# Patient Record
Sex: FEMALE | Race: WHITE | NOT HISPANIC OR LATINO | Employment: UNEMPLOYED | URBAN - METROPOLITAN AREA
[De-identification: names, ages, dates, MRNs, and addresses within clinical notes are randomized per-mention and may not be internally consistent; named-entity substitution may affect disease eponyms.]

---

## 2017-02-03 ENCOUNTER — OFFICE VISIT (OUTPATIENT)
Dept: INTERNAL MEDICINE | Facility: CLINIC | Age: 47
End: 2017-02-03

## 2017-02-03 ENCOUNTER — TELEPHONE (OUTPATIENT)
Dept: INTERNAL MEDICINE | Facility: CLINIC | Age: 47
End: 2017-02-03

## 2017-02-03 VITALS
BODY MASS INDEX: 25.79 KG/M2 | HEART RATE: 92 BPM | DIASTOLIC BLOOD PRESSURE: 80 MMHG | SYSTOLIC BLOOD PRESSURE: 114 MMHG | OXYGEN SATURATION: 98 % | HEIGHT: 64 IN | WEIGHT: 151.06 LBS

## 2017-02-03 DIAGNOSIS — M54.16 LUMBAR RADICULOPATHY: ICD-10-CM

## 2017-02-03 DIAGNOSIS — M54.50 ACUTE BILATERAL LOW BACK PAIN WITHOUT SCIATICA: Primary | ICD-10-CM

## 2017-02-03 PROBLEM — C73 THYROID CARCINOMA (HCC): Status: ACTIVE | Noted: 2017-02-03

## 2017-02-03 PROCEDURE — 99213 OFFICE O/P EST LOW 20 MIN: CPT | Performed by: NURSE PRACTITIONER

## 2017-02-03 RX ORDER — MELOXICAM 7.5 MG/1
7.5 TABLET ORAL DAILY
Qty: 60 TABLET | Refills: 0 | Status: SHIPPED | OUTPATIENT
Start: 2017-02-03 | End: 2019-12-20

## 2017-02-03 RX ORDER — CYCLOBENZAPRINE HCL 5 MG
5 TABLET ORAL NIGHTLY PRN
Qty: 30 TABLET | Refills: 0 | Status: SHIPPED | OUTPATIENT
Start: 2017-02-03 | End: 2019-12-20

## 2017-02-03 NOTE — TELEPHONE ENCOUNTER
----- Message from Lelo Fox sent at 2/3/2017  9:09 AM EST -----  Regarding: APPOINTMENT REQUEST  Contact: [phone]  Patient called requesting to be seen today - do you see any place to put her?  She has a long h/o low back pain since she was a teenager. Present episode began insidiously on Tuesday with pain across the low back radiating into both hips and both thighs to the knees, anteriorly and posteriorly, without numbness/tingling or weakness.  However, she says the pain is pretty bad.

## 2017-08-09 ENCOUNTER — TRANSCRIBE ORDERS (OUTPATIENT)
Dept: ADMINISTRATIVE | Facility: HOSPITAL | Age: 47
End: 2017-08-09

## 2017-08-09 ENCOUNTER — LAB (OUTPATIENT)
Dept: LAB | Facility: HOSPITAL | Age: 47
End: 2017-08-09

## 2017-08-09 DIAGNOSIS — E83.51 CALCIUM DEFICIENCY DISEASE: ICD-10-CM

## 2017-08-09 DIAGNOSIS — E83.51 CALCIUM DEFICIENCY DISEASE: Primary | ICD-10-CM

## 2017-08-09 LAB
CALCIUM SPEC-SCNC: 9.5 MG/DL (ref 8.6–10.5)
PTH-INTACT SERPL-MCNC: 11.3 PG/ML (ref 15–65)

## 2017-08-09 PROCEDURE — 83970 ASSAY OF PARATHORMONE: CPT | Performed by: SURGERY

## 2017-08-09 PROCEDURE — 36415 COLL VENOUS BLD VENIPUNCTURE: CPT

## 2017-08-09 PROCEDURE — 82310 ASSAY OF CALCIUM: CPT

## 2017-11-09 ENCOUNTER — HOSPITAL ENCOUNTER (OUTPATIENT)
Dept: GENERAL RADIOLOGY | Facility: HOSPITAL | Age: 47
Discharge: HOME | End: 2017-11-09

## 2017-11-09 ENCOUNTER — HOSPITAL ENCOUNTER (OUTPATIENT)
Dept: GENERAL RADIOLOGY | Facility: HOSPITAL | Age: 47
Discharge: HOME | End: 2017-11-09
Admitting: CHIROPRACTOR

## 2017-11-09 ENCOUNTER — TRANSCRIBE ORDERS (OUTPATIENT)
Dept: ADMINISTRATIVE | Facility: HOSPITAL | Age: 47
End: 2017-11-09

## 2017-11-09 DIAGNOSIS — M54.2 NECK PAIN: ICD-10-CM

## 2017-11-09 DIAGNOSIS — M99.03 SOMATIC DYSFUNCTION OF LUMBAR REGION: ICD-10-CM

## 2017-11-09 DIAGNOSIS — M54.40 LOW BACK PAIN WITH SCIATICA, SCIATICA LATERALITY UNSPECIFIED, UNSPECIFIED BACK PAIN LATERALITY, UNSPECIFIED CHRONICITY: Primary | ICD-10-CM

## 2017-11-09 DIAGNOSIS — M54.40 LOW BACK PAIN WITH SCIATICA, SCIATICA LATERALITY UNSPECIFIED, UNSPECIFIED BACK PAIN LATERALITY, UNSPECIFIED CHRONICITY: ICD-10-CM

## 2017-11-09 DIAGNOSIS — M99.01 CERVICAL SEGMENT DYSFUNCTION: ICD-10-CM

## 2017-11-09 PROCEDURE — 72110 X-RAY EXAM L-2 SPINE 4/>VWS: CPT

## 2017-11-09 PROCEDURE — 72050 X-RAY EXAM NECK SPINE 4/5VWS: CPT

## 2018-03-07 ENCOUNTER — APPOINTMENT (OUTPATIENT)
Dept: WOMENS IMAGING | Facility: HOSPITAL | Age: 48
End: 2018-03-07

## 2018-03-07 PROCEDURE — 77067 SCR MAMMO BI INCL CAD: CPT | Performed by: RADIOLOGY

## 2018-03-07 PROCEDURE — 77063 BREAST TOMOSYNTHESIS BI: CPT | Performed by: RADIOLOGY

## 2018-07-05 ENCOUNTER — OFFICE VISIT (OUTPATIENT)
Dept: INTERNAL MEDICINE | Facility: CLINIC | Age: 48
End: 2018-07-05

## 2018-07-05 VITALS
DIASTOLIC BLOOD PRESSURE: 70 MMHG | HEIGHT: 64 IN | HEART RATE: 107 BPM | TEMPERATURE: 98.1 F | WEIGHT: 154.6 LBS | BODY MASS INDEX: 26.4 KG/M2 | SYSTOLIC BLOOD PRESSURE: 110 MMHG | OXYGEN SATURATION: 97 %

## 2018-07-05 DIAGNOSIS — R40.0 DAYTIME SOMNOLENCE: ICD-10-CM

## 2018-07-05 DIAGNOSIS — E78.5 HYPERLIPIDEMIA, UNSPECIFIED HYPERLIPIDEMIA TYPE: ICD-10-CM

## 2018-07-05 DIAGNOSIS — R06.83 SNORING: ICD-10-CM

## 2018-07-05 DIAGNOSIS — C73 THYROID CARCINOMA (HCC): ICD-10-CM

## 2018-07-05 DIAGNOSIS — K91.1 DUMPING SYNDROME: ICD-10-CM

## 2018-07-05 DIAGNOSIS — Z00.00 HEALTH CARE MAINTENANCE: Primary | ICD-10-CM

## 2018-07-05 PROBLEM — J45.909 ASTHMA: Status: ACTIVE | Noted: 2018-07-05

## 2018-07-05 PROCEDURE — 99396 PREV VISIT EST AGE 40-64: CPT | Performed by: NURSE PRACTITIONER

## 2018-07-05 RX ORDER — MONTELUKAST SODIUM 10 MG/1
10 TABLET ORAL NIGHTLY
Qty: 30 TABLET | Refills: 0
Start: 2018-07-05 | End: 2019-07-11 | Stop reason: SDUPTHER

## 2018-07-05 RX ORDER — LEVOTHYROXINE SODIUM 0.12 MG/1
125 TABLET ORAL DAILY
Qty: 30 TABLET | Refills: 2
Start: 2018-07-05 | End: 2019-07-11

## 2018-07-05 RX ORDER — CHOLESTYRAMINE 4 G/9G
1 POWDER, FOR SUSPENSION ORAL DAILY PRN
Qty: 60 EACH | Refills: 3 | Status: SHIPPED | OUTPATIENT
Start: 2018-07-05 | End: 2018-11-29 | Stop reason: SDUPTHER

## 2018-07-05 NOTE — PROGRESS NOTES
"Subjective   Juliana Rodriguez is a 47 y.o. female here for CPE.    History of Present Illness   The patient is here today for CPE. She is feeling ok. Is having fatigue. Getting at least 7 hrs of sleep. She does occasionally snore, more with allergies. She is using elliptical/treadmill/weights 3 days a week. She is eating a healthy diet.   Diarrhea- since GB removal, probiotic not helping.   Started to have irregular periods. Also with hot flashes and night sweats occasionally.   GYN- UTD    Hx of thyroid carcinoma- monitored per Dr. Orosco, labs ok per patient   Raymundo, may send son Raymundo as new patient.   The following portions of the patient's history were reviewed and updated as appropriate: allergies, current medications, past family history, past medical history, past social history, past surgical history and problem list.    Review of Systems   Constitutional: Positive for fatigue.   HENT: Negative.    Eyes: Negative.    Respiratory: Positive for cough (with allergies) and wheezing (allergies).    Cardiovascular: Positive for palpitations (lasts for 1-2 seconds, maybe every couple of months). Negative for chest pain.   Gastrointestinal: Positive for diarrhea (since GB). Negative for abdominal distention, abdominal pain, anal bleeding, blood in stool, constipation, nausea, rectal pain and vomiting.   Endocrine: Positive for heat intolerance. Negative for cold intolerance.   Genitourinary: Negative.    Musculoskeletal: Positive for back pain (occ).   Skin: Negative.    Allergic/Immunologic: Positive for environmental allergies. Negative for food allergies.   Neurological: Positive for dizziness (occ. \"a little bit\"). Negative for tremors, seizures, syncope, facial asymmetry, speech difficulty, weakness, light-headedness, numbness and headaches.   Hematological: Positive for adenopathy (cervical ). Does not bruise/bleed easily.   Psychiatric/Behavioral: Negative.        Objective   Physical Exam "   Constitutional: She is oriented to person, place, and time. Vital signs are normal. She appears well-developed and well-nourished.   HENT:   Right Ear: Hearing, tympanic membrane, external ear and ear canal normal.   Left Ear: Hearing, tympanic membrane, external ear and ear canal normal.   Nose: Mucosal edema (left nare) present.   Mouth/Throat: Uvula is midline, oropharynx is clear and moist and mucous membranes are normal.   Ridging on tongue   Eyes: Conjunctivae, EOM and lids are normal. Pupils are equal, round, and reactive to light.   Neck: Normal range of motion. Neck supple. Normal carotid pulses present. Carotid bruit is not present. No thyromegaly present.   Cardiovascular: Normal rate, regular rhythm, normal heart sounds and intact distal pulses.    Pulmonary/Chest: Effort normal and breath sounds normal.   Abdominal: Soft. Normal appearance, normal aorta and bowel sounds are normal. There is no hepatosplenomegaly. There is no tenderness.   Musculoskeletal: Normal range of motion.   Lymphadenopathy:     She has no cervical adenopathy.        Right: No inguinal and no supraclavicular adenopathy present.        Left: No inguinal and no supraclavicular adenopathy present.   Neurological: She is alert and oriented to person, place, and time. She has normal strength. No cranial nerve deficit or sensory deficit.   Reflex Scores:       Patellar reflexes are 1+ on the right side and 1+ on the left side.  Skin: Skin is warm, dry and intact.   Psychiatric: She has a normal mood and affect. Her speech is normal and behavior is normal. Judgment and thought content normal. Cognition and memory are normal.       Assessment/Plan   There are no diagnoses linked to this encounter.    1. HCM- continue to be active and eat a healthy diet.   2. Daytime somnolence/snoring- check sleep study, check B12 if not already done  3. Dumping syndrome- try cholestyramine, set up for C-scope  4. Hx of thyroid carcinoma- need note from  [facility]  5. HPL- Family hx of heart disease. Check today

## 2018-07-12 ENCOUNTER — RESULTS ENCOUNTER (OUTPATIENT)
Dept: INTERNAL MEDICINE | Facility: CLINIC | Age: 48
End: 2018-07-12

## 2018-07-12 DIAGNOSIS — R06.83 SNORING: ICD-10-CM

## 2018-07-12 DIAGNOSIS — R40.0 DAYTIME SOMNOLENCE: ICD-10-CM

## 2018-07-12 DIAGNOSIS — E78.5 HYPERLIPIDEMIA, UNSPECIFIED HYPERLIPIDEMIA TYPE: ICD-10-CM

## 2018-07-12 DIAGNOSIS — Z00.00 HEALTH CARE MAINTENANCE: ICD-10-CM

## 2018-07-12 LAB
ALBUMIN SERPL-MCNC: 5 G/DL (ref 3.5–5.2)
ALBUMIN/GLOB SERPL: 2 G/DL
ALP SERPL-CCNC: 61 U/L (ref 39–117)
ALT SERPL-CCNC: 16 U/L (ref 1–33)
AST SERPL-CCNC: 14 U/L (ref 1–32)
BASOPHILS # BLD AUTO: 0.05 10*3/MM3 (ref 0–0.2)
BASOPHILS NFR BLD AUTO: 0.5 % (ref 0–1.5)
BILIRUB SERPL-MCNC: 0.4 MG/DL (ref 0.1–1.2)
BUN SERPL-MCNC: 8 MG/DL (ref 6–20)
BUN/CREAT SERPL: 8.5 (ref 7–25)
CALCIUM SERPL-MCNC: 9.2 MG/DL (ref 8.6–10.5)
CHLORIDE SERPL-SCNC: 99 MMOL/L (ref 98–107)
CHOLEST SERPL-MCNC: 204 MG/DL (ref 0–200)
CO2 SERPL-SCNC: 20.6 MMOL/L (ref 22–29)
CREAT SERPL-MCNC: 0.94 MG/DL (ref 0.57–1)
EOSINOPHIL # BLD AUTO: 0.36 10*3/MM3 (ref 0–0.7)
EOSINOPHIL NFR BLD AUTO: 3.4 % (ref 0.3–6.2)
ERYTHROCYTE [DISTWIDTH] IN BLOOD BY AUTOMATED COUNT: 14.2 % (ref 11.7–13)
FOLATE SERPL-MCNC: 18.7 NG/ML (ref 4.78–24.2)
GLOBULIN SER CALC-MCNC: 2.5 GM/DL
GLUCOSE SERPL-MCNC: 84 MG/DL (ref 65–99)
HCT VFR BLD AUTO: 47.2 % (ref 35.6–45.5)
HDLC SERPL-MCNC: 47 MG/DL (ref 40–60)
HGB BLD-MCNC: 15.2 G/DL (ref 11.9–15.5)
IMM GRANULOCYTES # BLD: 0.02 10*3/MM3 (ref 0–0.03)
IMM GRANULOCYTES NFR BLD: 0.2 % (ref 0–0.5)
LDLC SERPL CALC-MCNC: 107 MG/DL (ref 0–100)
LDLC/HDLC SERPL: 2.28 {RATIO}
LYMPHOCYTES # BLD AUTO: 3.95 10*3/MM3 (ref 0.9–4.8)
LYMPHOCYTES NFR BLD AUTO: 37 % (ref 19.6–45.3)
MCH RBC QN AUTO: 29.1 PG (ref 26.9–32)
MCHC RBC AUTO-ENTMCNC: 32.2 G/DL (ref 32.4–36.3)
MCV RBC AUTO: 90.2 FL (ref 80.5–98.2)
MONOCYTES # BLD AUTO: 0.57 10*3/MM3 (ref 0.2–1.2)
MONOCYTES NFR BLD AUTO: 5.3 % (ref 5–12)
NEUTROPHILS # BLD AUTO: 5.74 10*3/MM3 (ref 1.9–8.1)
NEUTROPHILS NFR BLD AUTO: 53.8 % (ref 42.7–76)
PLATELET # BLD AUTO: 266 10*3/MM3 (ref 140–500)
POTASSIUM SERPL-SCNC: 4.7 MMOL/L (ref 3.5–5.2)
PROT SERPL-MCNC: 7.5 G/DL (ref 6–8.5)
RBC # BLD AUTO: 5.23 10*6/MM3 (ref 3.9–5.2)
SODIUM SERPL-SCNC: 137 MMOL/L (ref 136–145)
TRIGL SERPL-MCNC: 250 MG/DL (ref 0–150)
VIT B12 SERPL-MCNC: 836 PG/ML (ref 211–946)
VLDLC SERPL CALC-MCNC: 50 MG/DL (ref 5–40)
WBC # BLD AUTO: 10.67 10*3/MM3 (ref 4.5–10.7)

## 2018-07-30 ENCOUNTER — TELEPHONE (OUTPATIENT)
Dept: INTERNAL MEDICINE | Facility: CLINIC | Age: 48
End: 2018-07-30

## 2018-09-12 ENCOUNTER — PREP FOR SURGERY (OUTPATIENT)
Dept: OTHER | Facility: HOSPITAL | Age: 48
End: 2018-09-12

## 2018-09-12 DIAGNOSIS — R19.7 DIARRHEA, UNSPECIFIED TYPE: Primary | ICD-10-CM

## 2018-10-26 ENCOUNTER — OUTSIDE FACILITY SERVICE (OUTPATIENT)
Dept: GASTROENTEROLOGY | Facility: CLINIC | Age: 48
End: 2018-10-26

## 2018-10-26 PROCEDURE — 45380 COLONOSCOPY AND BIOPSY: CPT | Performed by: INTERNAL MEDICINE

## 2018-10-26 PROCEDURE — 45385 COLONOSCOPY W/LESION REMOVAL: CPT | Performed by: INTERNAL MEDICINE

## 2018-11-05 ENCOUNTER — TELEPHONE (OUTPATIENT)
Dept: GASTROENTEROLOGY | Facility: CLINIC | Age: 48
End: 2018-11-05

## 2018-11-05 NOTE — TELEPHONE ENCOUNTER
Colon polyps were benign-repeat colonoscopy in 10 years for screening.  No inflammation was found on colon biopsies.

## 2018-11-06 NOTE — TELEPHONE ENCOUNTER
Called pt and advised per Dr Conley that the colon polyps that were remove were benign.  No inflammation was found on colon bx.  She recommends a repeat c/s in 10 yrs for screening. Pt verb understanding.     C/s placed in recall for 10/26/228.

## 2018-11-28 ENCOUNTER — CLINICAL SUPPORT (OUTPATIENT)
Dept: INTERNAL MEDICINE | Facility: CLINIC | Age: 48
End: 2018-11-28

## 2018-11-28 DIAGNOSIS — Z23 NEED FOR HEPATITIS A IMMUNIZATION: Primary | ICD-10-CM

## 2018-11-28 PROCEDURE — 90471 IMMUNIZATION ADMIN: CPT | Performed by: NURSE PRACTITIONER

## 2018-11-28 PROCEDURE — 90632 HEPA VACCINE ADULT IM: CPT | Performed by: NURSE PRACTITIONER

## 2018-11-29 DIAGNOSIS — K91.1 DUMPING SYNDROME: ICD-10-CM

## 2018-11-29 RX ORDER — CHOLESTYRAMINE 4 G/9G
1 POWDER, FOR SUSPENSION ORAL DAILY PRN
Qty: 60 EACH | Refills: 3 | Status: SHIPPED | OUTPATIENT
Start: 2018-11-29 | End: 2019-03-27 | Stop reason: SDUPTHER

## 2019-03-14 ENCOUNTER — APPOINTMENT (OUTPATIENT)
Dept: WOMENS IMAGING | Facility: HOSPITAL | Age: 49
End: 2019-03-14

## 2019-03-14 PROCEDURE — 77063 BREAST TOMOSYNTHESIS BI: CPT | Performed by: RADIOLOGY

## 2019-03-14 PROCEDURE — 77067 SCR MAMMO BI INCL CAD: CPT | Performed by: RADIOLOGY

## 2019-03-27 DIAGNOSIS — K91.1 DUMPING SYNDROME: ICD-10-CM

## 2019-03-28 RX ORDER — CHOLESTYRAMINE 4 G/9G
1 POWDER, FOR SUSPENSION ORAL DAILY PRN
Qty: 60 PACKET | Refills: 0 | Status: SHIPPED | OUTPATIENT
Start: 2019-03-28 | End: 2019-07-11 | Stop reason: SDUPTHER

## 2019-07-03 DIAGNOSIS — Z00.00 ANNUAL PHYSICAL EXAM: Primary | ICD-10-CM

## 2019-07-03 DIAGNOSIS — E78.5 HYPERLIPIDEMIA, UNSPECIFIED HYPERLIPIDEMIA TYPE: ICD-10-CM

## 2019-07-03 DIAGNOSIS — E55.9 VITAMIN D DEFICIENCY: ICD-10-CM

## 2019-07-08 LAB
25(OH)D3+25(OH)D2 SERPL-MCNC: 29.1 NG/ML (ref 30–100)
ALBUMIN SERPL-MCNC: 4.6 G/DL (ref 3.5–5.2)
ALBUMIN/GLOB SERPL: 2.1 G/DL
ALP SERPL-CCNC: 62 U/L (ref 39–117)
ALT SERPL-CCNC: 16 U/L (ref 1–33)
AST SERPL-CCNC: 16 U/L (ref 1–32)
BASOPHILS # BLD AUTO: 0.08 10*3/MM3 (ref 0–0.2)
BASOPHILS NFR BLD AUTO: 1.2 % (ref 0–1.5)
BILIRUB SERPL-MCNC: 0.3 MG/DL (ref 0.2–1.2)
BUN SERPL-MCNC: 8 MG/DL (ref 6–20)
BUN/CREAT SERPL: 9.5 (ref 7–25)
CALCIUM SERPL-MCNC: 8.7 MG/DL (ref 8.6–10.5)
CHLORIDE SERPL-SCNC: 104 MMOL/L (ref 98–107)
CHOLEST SERPL-MCNC: 193 MG/DL (ref 0–200)
CO2 SERPL-SCNC: 22.9 MMOL/L (ref 22–29)
CREAT SERPL-MCNC: 0.84 MG/DL (ref 0.57–1)
EOSINOPHIL # BLD AUTO: 0.36 10*3/MM3 (ref 0–0.4)
EOSINOPHIL NFR BLD AUTO: 5.2 % (ref 0.3–6.2)
ERYTHROCYTE [DISTWIDTH] IN BLOOD BY AUTOMATED COUNT: 13.6 % (ref 12.3–15.4)
GLOBULIN SER CALC-MCNC: 2.2 GM/DL
GLUCOSE SERPL-MCNC: 87 MG/DL (ref 65–99)
HCT VFR BLD AUTO: 44 % (ref 34–46.6)
HDLC SERPL-MCNC: 42 MG/DL (ref 40–60)
HGB BLD-MCNC: 14.3 G/DL (ref 12–15.9)
IMM GRANULOCYTES # BLD AUTO: 0.02 10*3/MM3 (ref 0–0.05)
IMM GRANULOCYTES NFR BLD AUTO: 0.3 % (ref 0–0.5)
LDLC SERPL CALC-MCNC: 109 MG/DL (ref 0–100)
LDLC/HDLC SERPL: 2.6 {RATIO}
LYMPHOCYTES # BLD AUTO: 2.41 10*3/MM3 (ref 0.7–3.1)
LYMPHOCYTES NFR BLD AUTO: 34.7 % (ref 19.6–45.3)
MCH RBC QN AUTO: 28.5 PG (ref 26.6–33)
MCHC RBC AUTO-ENTMCNC: 32.5 G/DL (ref 31.5–35.7)
MCV RBC AUTO: 87.8 FL (ref 79–97)
MONOCYTES # BLD AUTO: 0.41 10*3/MM3 (ref 0.1–0.9)
MONOCYTES NFR BLD AUTO: 5.9 % (ref 5–12)
NEUTROPHILS # BLD AUTO: 3.66 10*3/MM3 (ref 1.7–7)
NEUTROPHILS NFR BLD AUTO: 52.7 % (ref 42.7–76)
NRBC BLD AUTO-RTO: 0 /100 WBC (ref 0–0.2)
PLATELET # BLD AUTO: 247 10*3/MM3 (ref 140–450)
POTASSIUM SERPL-SCNC: 4.1 MMOL/L (ref 3.5–5.2)
PROT SERPL-MCNC: 6.8 G/DL (ref 6–8.5)
RBC # BLD AUTO: 5.01 10*6/MM3 (ref 3.77–5.28)
SODIUM SERPL-SCNC: 139 MMOL/L (ref 136–145)
TRIGL SERPL-MCNC: 210 MG/DL (ref 0–150)
TSH SERPL DL<=0.005 MIU/L-ACNC: 0.87 MIU/ML (ref 0.27–4.2)
VLDLC SERPL CALC-MCNC: 42 MG/DL
WBC # BLD AUTO: 6.94 10*3/MM3 (ref 3.4–10.8)

## 2019-07-11 ENCOUNTER — OFFICE VISIT (OUTPATIENT)
Dept: INTERNAL MEDICINE | Facility: CLINIC | Age: 49
End: 2019-07-11

## 2019-07-11 VITALS
HEART RATE: 105 BPM | SYSTOLIC BLOOD PRESSURE: 114 MMHG | BODY MASS INDEX: 25.56 KG/M2 | HEIGHT: 64 IN | TEMPERATURE: 98.9 F | WEIGHT: 149.7 LBS | OXYGEN SATURATION: 98 % | DIASTOLIC BLOOD PRESSURE: 80 MMHG

## 2019-07-11 DIAGNOSIS — C73 THYROID CARCINOMA (HCC): ICD-10-CM

## 2019-07-11 DIAGNOSIS — J30.9 ALLERGIC RHINITIS, UNSPECIFIED SEASONALITY, UNSPECIFIED TRIGGER: ICD-10-CM

## 2019-07-11 DIAGNOSIS — J45.21 MILD INTERMITTENT ASTHMA WITH ACUTE EXACERBATION: ICD-10-CM

## 2019-07-11 DIAGNOSIS — M79.672 LEFT FOOT PAIN: ICD-10-CM

## 2019-07-11 DIAGNOSIS — K91.1 DUMPING SYNDROME: ICD-10-CM

## 2019-07-11 DIAGNOSIS — Z00.00 HEALTH CARE MAINTENANCE: Primary | ICD-10-CM

## 2019-07-11 DIAGNOSIS — E55.9 VITAMIN D DEFICIENCY: ICD-10-CM

## 2019-07-11 PROCEDURE — 93000 ELECTROCARDIOGRAM COMPLETE: CPT | Performed by: NURSE PRACTITIONER

## 2019-07-11 PROCEDURE — 99396 PREV VISIT EST AGE 40-64: CPT | Performed by: NURSE PRACTITIONER

## 2019-07-11 RX ORDER — CHOLESTYRAMINE 4 G/9G
1 POWDER, FOR SUSPENSION ORAL DAILY PRN
Qty: 60 PACKET | Refills: 2 | Status: SHIPPED | OUTPATIENT
Start: 2019-07-11 | End: 2019-10-30 | Stop reason: SDUPTHER

## 2019-07-11 RX ORDER — FLUTICASONE PROPIONATE 50 MCG
1 SPRAY, SUSPENSION (ML) NASAL DAILY
Qty: 3 BOTTLE | Refills: 3 | Status: SHIPPED | OUTPATIENT
Start: 2019-07-11

## 2019-07-11 RX ORDER — MONTELUKAST SODIUM 10 MG/1
10 TABLET ORAL NIGHTLY
Qty: 90 TABLET | Refills: 3
Start: 2019-07-11 | End: 2019-11-21 | Stop reason: SDUPTHER

## 2019-07-11 RX ORDER — LEVOTHYROXINE SODIUM 150 MCG
150 TABLET ORAL DAILY
Refills: 3 | COMMUNITY
Start: 2019-06-29 | End: 2022-05-26

## 2019-07-11 RX ORDER — BUDESONIDE AND FORMOTEROL FUMARATE DIHYDRATE 80; 4.5 UG/1; UG/1
2 AEROSOL RESPIRATORY (INHALATION)
Qty: 10.2 G | Refills: 3 | Status: SHIPPED | OUTPATIENT
Start: 2019-07-11 | End: 2019-10-31 | Stop reason: SDUPTHER

## 2019-07-11 NOTE — PROGRESS NOTES
Subjective   Juliana Rodriguez is a 48 y.o. female.     History of Present Illness   The patient is here today for CPE and lab work F/U. She is feeling well. She thinks she has plantar fasciitis. She saw podiatry about a year ago. Using exercises but not helping.     Hypothyroidism/Hx of thyroid carcinoma-  Asthma- just the last few weeks with cough, dry, no fever  Dumping syndrome- much better  Periods irregular, UTD with GYN    The following portions of the patient's history were reviewed and updated as appropriate: allergies, current medications, past family history, past medical history, past social history, past surgical history and problem list.    Review of Systems   Constitutional: Negative for chills, fatigue and fever.   HENT: Negative for ear pain, rhinorrhea and sore throat.    Eyes: Negative.    Respiratory: Positive for cough, shortness of breath and wheezing.    Cardiovascular: Negative.    Gastrointestinal: Positive for diarrhea (treated).   Endocrine: Negative for cold intolerance and heat intolerance.   Genitourinary: Negative for breast discharge, difficulty urinating, dyspareunia, dysuria, flank pain, frequency, genital sores, hematuria, pelvic pain, urgency, breast lump and breast pain.   Musculoskeletal: Negative.    Skin: Negative.    Allergic/Immunologic: Negative for environmental allergies and food allergies.   Neurological: Negative.    Hematological: Negative.    Psychiatric/Behavioral: Negative for dysphoric mood and suicidal ideas. The patient is not nervous/anxious.        Objective   Physical Exam   Constitutional: She is oriented to person, place, and time. Vital signs are normal. She appears well-developed and well-nourished. She is cooperative.   Body mass index is 25.68 kg/m².     HENT:   Right Ear: Hearing, tympanic membrane, external ear and ear canal normal.   Left Ear: Hearing, tympanic membrane, external ear and ear canal normal.   Nose: Nose normal.   Mouth/Throat: Uvula is  midline, oropharynx is clear and moist and mucous membranes are normal.   Eyes: Conjunctivae, EOM and lids are normal. Pupils are equal, round, and reactive to light.   Neck: Trachea normal and normal range of motion. Carotid bruit is not present. No thyroid mass and no thyromegaly present.   Cardiovascular: Normal rate, regular rhythm, normal heart sounds and normal pulses.   Pulmonary/Chest: Effort normal and breath sounds normal.   Abdominal: Soft. Normal appearance, normal aorta and bowel sounds are normal. There is no hepatosplenomegaly. There is no tenderness.   Musculoskeletal: Normal range of motion.   Lymphadenopathy:     She has no cervical adenopathy.     She has no axillary adenopathy. No inguinal adenopathy noted on the right or left side.        Right: No inguinal and no supraclavicular adenopathy present.        Left: No inguinal and no supraclavicular adenopathy present.   Neurological: She is alert and oriented to person, place, and time. She has normal strength. No cranial nerve deficit or sensory deficit. She displays a negative Romberg sign. GCS eye subscore is 4. GCS verbal subscore is 5. GCS motor subscore is 6.   Reflex Scores:       Patellar reflexes are 2+ on the right side and 2+ on the left side.  Skin: Skin is warm, dry and intact.   Psychiatric: She has a normal mood and affect. Her speech is normal and behavior is normal. Judgment and thought content normal. Cognition and memory are normal.       Assessment/Plan   There are no diagnoses linked to this encounter.    HCM/EKG- NSR, no baseline for comparison         1. HCM- return to daily exercise  2. Asthma- needs symbicort, coupon given  3. AR- ok to add zyrtec, has an inhaler at home to use PRN  4. Left foot pain- see podiatry back  5. Hx of thyroid carcinoma- send TSH to [facility], may need adjustment  6. Vit d def- restart VitD3 2000 IU daily    Needs to wear sunscreen  Dentist- UTD

## 2019-10-30 DIAGNOSIS — K91.1 DUMPING SYNDROME: ICD-10-CM

## 2019-10-31 DIAGNOSIS — J45.21 MILD INTERMITTENT ASTHMA WITH ACUTE EXACERBATION: ICD-10-CM

## 2019-10-31 RX ORDER — CHOLESTYRAMINE 4 G/9G
1 POWDER, FOR SUSPENSION ORAL DAILY PRN
Qty: 90 PACKET | Refills: 1 | Status: SHIPPED | OUTPATIENT
Start: 2019-10-31 | End: 2020-04-27

## 2019-10-31 RX ORDER — DILTIAZEM HYDROCHLORIDE 60 MG/1
TABLET, FILM COATED ORAL
Qty: 30.6 INHALER | Refills: 1 | Status: SHIPPED | OUTPATIENT
Start: 2019-10-31 | End: 2019-12-09

## 2019-11-21 ENCOUNTER — OFFICE VISIT (OUTPATIENT)
Dept: INTERNAL MEDICINE | Facility: CLINIC | Age: 49
End: 2019-11-21

## 2019-11-21 VITALS
TEMPERATURE: 98.6 F | DIASTOLIC BLOOD PRESSURE: 76 MMHG | HEIGHT: 64 IN | OXYGEN SATURATION: 97 % | SYSTOLIC BLOOD PRESSURE: 114 MMHG | BODY MASS INDEX: 26.02 KG/M2 | HEART RATE: 107 BPM | WEIGHT: 152.4 LBS

## 2019-11-21 DIAGNOSIS — K21.9 GASTROESOPHAGEAL REFLUX DISEASE, ESOPHAGITIS PRESENCE NOT SPECIFIED: ICD-10-CM

## 2019-11-21 DIAGNOSIS — J45.21 MILD INTERMITTENT ASTHMA WITH ACUTE EXACERBATION: ICD-10-CM

## 2019-11-21 DIAGNOSIS — R07.9 CHEST PAIN, UNSPECIFIED TYPE: Primary | ICD-10-CM

## 2019-11-21 PROCEDURE — 93000 ELECTROCARDIOGRAM COMPLETE: CPT | Performed by: NURSE PRACTITIONER

## 2019-11-21 PROCEDURE — 90471 IMMUNIZATION ADMIN: CPT | Performed by: NURSE PRACTITIONER

## 2019-11-21 PROCEDURE — 90674 CCIIV4 VAC NO PRSV 0.5 ML IM: CPT | Performed by: NURSE PRACTITIONER

## 2019-11-21 PROCEDURE — 99214 OFFICE O/P EST MOD 30 MIN: CPT | Performed by: NURSE PRACTITIONER

## 2019-11-21 RX ORDER — MONTELUKAST SODIUM 10 MG/1
10 TABLET ORAL NIGHTLY
Qty: 90 TABLET | Refills: 3
Start: 2019-11-21 | End: 2019-12-09 | Stop reason: SDUPTHER

## 2019-11-21 NOTE — PROGRESS NOTES
"Subjective   Juliana Rodriguez is a 49 y.o. female.     History of Present Illness    The patient is here today with c/o chest \"achiness\" sharp when laying on left side.   Does not feel tight or wheezy. Dry hacky cough. Worse with talking or laughing. Chest sometimes feels rattly. Hoarse a lot since starting inhaler.  Out of singulair, pharmacy would not fill for some reason.     Endocrinology started her on cytomel this past summer and she had chest pain with this.     No recent travel or prolonged sitting. Dad with hx of blood clots.   The following portions of the patient's history were reviewed and updated as appropriate: allergies, current medications, past family history, past medical history, past social history, past surgical history and problem list.    Review of Systems   Constitutional: Negative.    HENT: Positive for voice change.    Respiratory: Positive for cough (dry) and wheezing (sometimes). Negative for chest tightness and shortness of breath.    Cardiovascular: Positive for chest pain. Negative for palpitations and leg swelling.   Gastrointestinal: Positive for GERD (\"sometimes\", treated with tums sometimes).       Objective   Physical Exam   Constitutional: She appears well-developed and well-nourished.   Neck: Normal range of motion. Neck supple. No thyromegaly present.   Cardiovascular: Normal rate, regular rhythm, normal heart sounds and intact distal pulses.   Pulmonary/Chest: Effort normal and breath sounds normal.   Abdominal: Soft. Normal appearance, normal aorta and bowel sounds are normal. There is tenderness (mild) in the suprapubic area.   Musculoskeletal:   No pain upon palpation of sternum or rib cage   Lymphadenopathy:     She has no cervical adenopathy.   Skin: Skin is warm and dry.   Psychiatric: She has a normal mood and affect. Her behavior is normal. Judgment and thought content normal.       Vitals:    11/21/19 1422   BP: 114/76   Pulse: 107   Temp: 98.6 °F (37 °C)   SpO2: 97% "     Body mass index is 26.15 kg/m².      Assessment/Plan   Juliana was seen today for cough, chest pain and hoarse.    Diagnoses and all orders for this visit:    Chest pain, unspecified type  -     D-dimer, Quantitative    Mild intermittent asthma with acute exacerbation    Gastroesophageal reflux disease, esophagitis presence not specified    Other orders  -     montelukast (SINGULAIR) 10 MG tablet; Take 1 tablet by mouth Every Night.        Chest pain- EKG NSR, no change from baseline         1. Chest pain- EKG ok, if symptoms worsen go to ER, check D-dimer, if pos ct angio. If not better check CXR, to f/u with endo in a few weeks  2. Asthma- restart singulair, switch to dulera   3. GERD- start prilosec, low acid diet    Flu vaccine- today

## 2019-11-22 LAB — D DIMER PPP FEU-MCNC: <0.27 MCGFEU/ML (ref 0–0.56)

## 2019-12-09 ENCOUNTER — TELEPHONE (OUTPATIENT)
Dept: INTERNAL MEDICINE | Facility: CLINIC | Age: 49
End: 2019-12-09

## 2019-12-09 RX ORDER — MONTELUKAST SODIUM 10 MG/1
10 TABLET ORAL NIGHTLY
Qty: 90 TABLET | Refills: 1 | Status: SHIPPED | OUTPATIENT
Start: 2019-12-09 | End: 2019-12-20

## 2019-12-09 NOTE — TELEPHONE ENCOUNTER
----- Message from JOSSY Ybarra sent at 12/9/2019 12:25 PM EST -----  Ok for singulair, dulera 100/5 2 puffs BID  ----- Message -----  From: Riley Gaston  Sent: 12/9/2019  11:21 AM EST  To: JOSSY Ybarra    Ok to send in Dulera? What strength?  ----- Message -----  From: Altagracia Yao  Sent: 12/9/2019  10:06 AM EST  To: Riley Gaston    Pt said the RX for:  montelukast (SINGULAIR) 10 MG tablet did not get to the pharmacy. Can you resend?  Also she would like an RX for Dulera called in instead of the Symbicort because it works better.  Pharmacy: [facility]

## 2019-12-12 ENCOUNTER — TELEPHONE (OUTPATIENT)
Dept: INTERNAL MEDICINE | Facility: CLINIC | Age: 49
End: 2019-12-12

## 2019-12-12 NOTE — TELEPHONE ENCOUNTER
----- Message from JOSSY Ybarra sent at 12/12/2019  2:01 PM EST -----  Contact: [phone]  Ok for Advair /21 2 puffs BID  ----- Message -----  From: Riley Gaston  Sent: 12/12/2019   1:14 PM EST  To: JOSSY Ybarra    Patient must try all formulary alternatives first. This includes:    Advair HFA  Advair diskus  Breo ellipta    Please advise  ----- Message -----  From: Meredith Arriola RegSched Rep  Sent: 12/12/2019  11:01 AM EST  To: Riley Gaston    Pt called to see the status of the PA for     mometasone-formoterol (DULERA) 100-5 MCG/ACT inhaler      PA Authorization:  [id number]

## 2019-12-18 ENCOUNTER — OFFICE VISIT (OUTPATIENT)
Dept: INTERNAL MEDICINE | Facility: CLINIC | Age: 49
End: 2019-12-18

## 2019-12-18 ENCOUNTER — HOSPITAL ENCOUNTER (OUTPATIENT)
Dept: GENERAL RADIOLOGY | Facility: HOSPITAL | Age: 49
Discharge: HOME | End: 2019-12-18
Admitting: NURSE PRACTITIONER

## 2019-12-18 VITALS
DIASTOLIC BLOOD PRESSURE: 80 MMHG | BODY MASS INDEX: 26.73 KG/M2 | HEIGHT: 64 IN | TEMPERATURE: 98.5 F | SYSTOLIC BLOOD PRESSURE: 120 MMHG | OXYGEN SATURATION: 98 % | HEART RATE: 101 BPM | WEIGHT: 156.6 LBS

## 2019-12-18 DIAGNOSIS — R07.9 CHEST PAIN, UNSPECIFIED TYPE: Primary | ICD-10-CM

## 2019-12-18 DIAGNOSIS — R06.02 SHORTNESS OF BREATH: ICD-10-CM

## 2019-12-18 DIAGNOSIS — R51.9 FREQUENT HEADACHES: ICD-10-CM

## 2019-12-18 DIAGNOSIS — R53.83 FATIGUE, UNSPECIFIED TYPE: ICD-10-CM

## 2019-12-18 PROCEDURE — 71046 X-RAY EXAM CHEST 2 VIEWS: CPT

## 2019-12-18 PROCEDURE — 99213 OFFICE O/P EST LOW 20 MIN: CPT | Performed by: NURSE PRACTITIONER

## 2019-12-18 RX ORDER — LANSOPRAZOLE 15 MG/1
15 CAPSULE, DELAYED RELEASE ORAL DAILY
Qty: 30 CAPSULE | Refills: 0
Start: 2019-12-18 | End: 2019-12-20

## 2019-12-18 NOTE — PROGRESS NOTES
"Subjective   Juliana Rodriguez is a 49 y.o. female.     History of Present Illness    The patient is here today with c/o achy pain in her chest intermittent. Restarted yesterday and lasted all night. She actually sat up in the bed suddenly because she could not get a deep breath, this was right as she was falling asleep. Side sleeping is worse. She wakes up snoring and with headaches often. Also fatigue.   All of this started about 1 1/2 years ago.   Had previous chest pains with cytomel. Tried prilosec caused stomach pain. Lansoprazole, no more indigestion, no change in chest pain.     SOA- felt like Dulera initially but not now. singulair no change. Advair does not work as well for patient.   occ nausea  Recent TSH 2 weeks ago \"good\" per patient.     Chest pain- 2 neg EKGs, does not hurt with deep breath  D-dimer neg 11/21  No recent travel or sitting for long periods of time.   No recent exercise due to back pain.   The following portions of the patient's history were reviewed and updated as appropriate: allergies, current medications, past family history, past medical history, past social history, past surgical history and problem list.    Review of Systems   Constitutional: Positive for fatigue. Negative for chills and fever.   Respiratory: Positive for cough, shortness of breath and wheezing.    Cardiovascular: Positive for chest pain and palpitations (a flutter every now and then).   Psychiatric/Behavioral: Negative.        Objective   Physical Exam   Constitutional: She appears well-developed and well-nourished.   HENT:   Mouth/Throat: Uvula is midline, oropharynx is clear and moist and mucous membranes are normal.   mallampati 1   Neck: Normal range of motion. Neck supple. No thyromegaly present.   Cardiovascular: Normal rate, regular rhythm, normal heart sounds and intact distal pulses.   Pulmonary/Chest: Effort normal and breath sounds normal.   No pain with palpation of sternum or rib cage  "   Lymphadenopathy:     She has no cervical adenopathy.   Skin: Skin is warm and dry.   Psychiatric: She has a normal mood and affect. Her behavior is normal. Judgment and thought content normal.       Vitals:    12/18/19 1143   BP: 120/80   Pulse:    Temp:    SpO2:      Body mass index is 26.86 kg/m².      Assessment/Plan   Juliana was seen today for chest pain and shortness of breath.    Diagnoses and all orders for this visit:    Chest pain, unspecified type  -     Ambulatory Referral to Cardiology  -     XR Chest PA & Lateral    Shortness of breath  -     Ambulatory Referral to Pulmonology  -     Ambulatory Referral to Cardiology  -     XR Chest PA & Lateral    Fatigue, unspecified type  -     Ambulatory Referral to Pulmonology  -     XR Chest PA & Lateral    Frequent headaches  -     XR Chest PA & Lateral    Other orders  -     lansoprazole (PREVACID) 15 MG capsule; Take 1 capsule by mouth Daily.                 1. Chest pain-  see cardiology for further eval  2. SOA/snoring/fatigue/ headaches- see pulmonary for work up on SOA and poss ENMANUEL, chest Xray  To ER with sustained or change in symptoms.

## 2019-12-20 ENCOUNTER — OFFICE VISIT (OUTPATIENT)
Dept: CARDIOLOGY | Facility: CLINIC | Age: 49
End: 2019-12-20

## 2019-12-20 VITALS
HEIGHT: 64 IN | HEART RATE: 88 BPM | DIASTOLIC BLOOD PRESSURE: 94 MMHG | WEIGHT: 152 LBS | SYSTOLIC BLOOD PRESSURE: 122 MMHG | BODY MASS INDEX: 25.95 KG/M2

## 2019-12-20 DIAGNOSIS — R00.2 PALPITATIONS: Primary | ICD-10-CM

## 2019-12-20 PROCEDURE — 93000 ELECTROCARDIOGRAM COMPLETE: CPT | Performed by: INTERNAL MEDICINE

## 2019-12-20 PROCEDURE — 99204 OFFICE O/P NEW MOD 45 MIN: CPT | Performed by: INTERNAL MEDICINE

## 2019-12-20 NOTE — PROGRESS NOTES
Subjective:     Encounter Date:12/20/2019      Patient ID: Juliana Rodriguez is a 49 y.o. female.    Chief Complaint: palpitations  HPI:   This is a 49-year-old lady who presents for evaluation of chest discomfort/palpitations.  She says that at night she has a sinking sensation in the chest.  She feels like her heart rate slows down.  This lasts for seconds/momentarily and resolves.  It will occur on and off for about 5 minutes.  It usually happens while laying in bed and falling asleep.  It occasionally happens throughout the day.  She notices no association with exertion.  She has no chest tightness or dyspnea with exertion.  She does have a feeling like she cannot catch a full breath/inhale deeply, however this occurs on and off throughout the day.  She was recently started on Advair by an allergist.  She has history of asthma.  She uses the elliptical for exercise.  She does not notice palpitations, chest pain or dyspnea when doing that.  She has never smoked.  Her father had bypass surgery in his 60s.  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  She also has a history of anxiety.  The following portions of the patient's history were reviewed and updated as appropriate: allergies, current medications, past family history, past medical history, past social history, past surgical history and problem list.         REVIEW OF SYSTEMS:   All systems reviewed.  Pertinent positives identified in HPI.  All other systems are negative.    Past Medical History:   Diagnosis Date   • Anxiety    • Depression    • Hyperlipidemia        Family History   Problem Relation Age of Onset   • Hyperlipidemia Mother    • Hypertension Mother    • Thyroid disease Mother    • Sleep apnea Mother    • Heart disease Father         QUAD bypass at 65 yrs old.    • Hyperlipidemia Father    • COPD Sister    • Cancer Maternal Aunt         ovarian   • Thyroid disease Maternal Aunt         graves disesase   • Thyroid disease Maternal Aunt          hypothyroidism   • No Known Problems Brother    • Heart attack Paternal Grandfather         at age 45    • No Known Problems Brother    • No Known Problems Brother    • No Known Problems Sister    • No Known Problems Maternal Grandmother         old age   • No Known Problems Maternal Grandfather    • Leukemia Paternal Grandmother    • No Known Problems Son    • Asthma Daughter        Social History     Socioeconomic History   • Marital status:      Spouse name: Raymundo   • Number of children: 2   • Years of education: Not on file   • Highest education level: Not on file   Occupational History   • Occupation: Homemaker   Tobacco Use   • Smoking status: Never Smoker   • Smokeless tobacco: Never Used   Substance and Sexual Activity   • Alcohol use: No   • Drug use: No   • Sexual activity: Yes     Partners: Male     Birth control/protection: Surgical   Lifestyle   • Physical activity:     Days per week: 0 days     Minutes per session: 0 min   • Stress: Not on file       Allergies   Allergen Reactions   • Codeine Nausea And Vomiting       Past Surgical History:   Procedure Laterality Date   • CHOLECYSTECTOMY     • DILATATION AND CURETTAGE     • THYROID LOBECTOMY Right 2016         ECG 12 Lead  Date/Time: 12/20/2019 3:50 PM  Performed by: Velvet Funes MD  Authorized by: Velvet Funes MD   Comparison: compared with previous ECG from 11/21/2019  Similar to previous ECG  Rhythm: sinus rhythm  Rate: normal  Conduction: conduction normal  Q waves: V2 and V1    ST Segments: ST segments normal  T Waves: T waves normal  QRS axis: normal  Other: no other findings    Clinical impression: non-specific ECG               Objective:         PHYSICAL EXAM:  GEN: VSS, no distress,   Eyes: normal sclera, normal lids and lashes  HENT: moist mucus membranes,   Respiratory: CTAB, no rales or wheezes  CV: RRR, no murmurs, , +2 DP and 2+ carotid pulses b/l  GI: NABS, soft,  Nontender, nondistended  MSK: no edema, no scoliosis or  kyphosis  Skin: no rash, warm, dry  Heme/Lymph: no bruising or bleeding  Psych: organized thought, normal behavior and affect  Neuro: Cranial nerves grossly intact, Alert and Oriented x 3.     Outpatient Encounter Medications as of 12/20/2019   Medication Sig Dispense Refill   • Cholecalciferol (VITAMIN D3) 1000 UNITS capsule Take 1 capsule by mouth Daily.     • cholestyramine (QUESTRAN) 4 g packet TAKE 1 PACKET BY MOUTH DAILY AS NEEDED (DIARRHEA). 90 packet 1   • Flaxseed, Linseed, (FLAXSEED OIL) 1000 MG capsule Take 1 capsule by mouth Daily.     • fluticasone (FLONASE) 50 MCG/ACT nasal spray 1 spray into the nostril(s) as directed by provider Daily. 3 bottle 3   • fluticasone-salmeterol (ADVAIR HFA) 115-21 MCG/ACT inhaler Inhale 2 puffs 2 (Two) Times a Day. 12 g 2   • SYNTHROID 150 MCG tablet Take 150 mcg by mouth Daily. Must take brand name  3   • [DISCONTINUED] cyclobenzaprine (FLEXERIL) 5 MG tablet Take 1 tablet by mouth At Night As Needed for muscle spasms. 30 tablet 0   • [DISCONTINUED] lansoprazole (PREVACID) 15 MG capsule Take 1 capsule by mouth Daily. 30 capsule 0   • [DISCONTINUED] meloxicam (MOBIC) 7.5 MG tablet Take 1 tablet by mouth Daily. 60 tablet 0   • [DISCONTINUED] montelukast (SINGULAIR) 10 MG tablet Take 1 tablet by mouth Every Night. 90 tablet 1     No facility-administered encounter medications on file as of 12/20/2019.              Assessment:          Diagnosis Plan   1. Palpitations  Holter Monitor - 48 Hour          Plan:       1.  This is a 49-year-old lady who describes chest discomfort.  I think this sounds most likely like palpitations, most likely PVCs.  She describes an intermittent slowing in her heart rate which I think is likely the compensatory pause post PVC.  I will have her wear a 48-hour Holter monitor to provide reassurance.  2.  Dyspnea: She also describes dyspnea which is I believe associated with her palpitations.  We will evaluate further with her Holter monitor/diary.   If there is no association between dyspnea and arrhythmias, we will get an echocardiogram.    Annie, thank you very much for referring this kind patient to me. Please call me with any questions or concerns. I will see the patient again in the office in 3 months.         Velvet Funes MD  12/20/19  [facility]

## 2020-01-30 NOTE — PROGRESS NOTES
Follow up for results as scheduled. Hospital Medicine History & Physical Note    Date of Service  1/30/2020    Primary Care Physician  Tanisha Flores M.D.    Consultants  Dr. Adhikari, pulmonary medicine    Code Status  DNR, confirmed with patient at bedside    Chief Complaint  Chief Complaint   Patient presents with   • Upper GI Bleed       History of Presenting Illness  87 y.o. male who presented on 1/29/2020 with blood.  This is a pleasant elderly gentleman who currently resides in a nursing home for rehabilitation after a fall with a left tibial plateau fracture.  He states that approximately 4 days ago, he developed episodes of nausea with vomiting that was dark and grainy in appearance.  He carries a history of atrial fibrillation on chronic anticoagulation as well as acid reflux.  Patient denies any significant NSAID use although he is not clear what is provided for him at the nursing home.  Patient otherwise denies any fevers, chills, headache, chest pain, cough, congestion, abdominal pain, diarrhea or dysuria.  He does report that he feels weak.  EMS was apparently alerted and when they arrived, the patient was noted to be pale and hypotensive.    Review of Systems  Review of Systems   Constitutional: Negative for chills and fever.   HENT: Negative for congestion and sore throat.    Eyes: Negative for photophobia.   Respiratory: Negative for cough, shortness of breath and wheezing.    Cardiovascular: Negative for chest pain and palpitations.   Gastrointestinal: Positive for nausea and vomiting. Negative for abdominal pain and diarrhea.   Genitourinary: Negative for dysuria.   Musculoskeletal: Negative for myalgias.   Skin: Negative.    Neurological: Positive for weakness. Negative for dizziness, tingling, focal weakness and headaches.   Psychiatric/Behavioral: Negative for depression and suicidal ideas.       Past Medical History  Past Medical History:   Diagnosis Date   • A-fib (HCC)    • Arrhythmia    • Arthritis    • Cataract    •  Diabetes (HCC)    • GERD (gastroesophageal reflux disease)    • Heart burn    • Hyperlipidemia    • Muscle disorder     nerve damage due to spinal stenosis   • Urinary incontinence        Surgical History  Past Surgical History:   Procedure Laterality Date   • GASTROSCOPY  2018    Procedure: GASTROSCOPY W/DILATION;  Surgeon: Inderjit Mai M.D.;  Location: SURGERY [facility];  Service: Gastroenterology   • GASTROSCOPY N/A 10/9/2017    Procedure: GASTROSCOPY;  Surgeon: Oh Hull D.O.;  Location: SURGERY [facility];  Service: Gastroenterology   • TONSILLECTOMY Bilateral 1936   • APPENDECTOMY  Early    • CATARACT PHACO WITH IOL Bilateral Early    • LAMINOTOMY  late 80s    spinal stenosis   • LAMINOTOMY  late 90s    spinal fusion        Family History  Family History   Problem Relation Age of Onset   • Heart Failure Mother    • Heart Attack Mother    • Heart Disease Mother         pacemaker   • Paranoid behavior Mother    • Heart Failure Father    • Cancer Sister         Breast   • Other Brother         colostomy   • Cancer Brother    • No Known Problems Maternal Grandmother    • No Known Problems Maternal Grandfather    • No Known Problems Paternal Grandmother    • No Known Problems Paternal Grandfather        Social History  Social History     Tobacco Use   • Smoking status: Former Smoker     Packs/day: 1.50     Years: 29.00     Pack years: 43.50     Types: Cigarettes, Pipe     Last attempt to quit: 1980     Years since quittin.1   • Smokeless tobacco: Never Used   • Tobacco comment: Started smoking at age 19   Substance Use Topics   • Alcohol use: No   • Drug use: No       Allergies  Allergies   Allergen Reactions   • Vancomycin Rash     Diffuse  Tolerates at slower infusion rates.        Medications  No current facility-administered medications on file prior to encounter.      Current Outpatient Medications on File Prior to Encounter   Medication Sig Dispense Refill   •  cloNIDine (CATAPRES) 0.1 MG Tab Take 1 Tab by mouth 2 Times a Day. 60 Tab 0   • levETIRAcetam (KEPPRA) 500 MG Tab Take 1 Tab by mouth 2 Times a Day. 60 Tab    • polyethylene glycol/lytes (MIRALAX) Pack Take 1 Packet by mouth 1 time daily as needed (if sennosides and docusate ineffective after 24 hours).  3   • acetaminophen (TYLENOL) 500 MG Tab Take 500-1,000 mg by mouth every 6 hours as needed for Mild Pain.     • aspirin (ASA) 81 MG Chew Tab chewable tablet Take 81 mg by mouth every day.     • cyanocobalamin (VITAMIN B12) 1000 MCG Tab Take 1,000 mcg by mouth every day.     • apixaban (ELIQUIS) 2.5mg Tab Take 2.5 mg by mouth 2 Times a Day.     • gabapentin (NEURONTIN) 100 MG Cap Take 100 mg by mouth 2 Times a Day.     • Magnesium Oxide 420 MG Tab Take 420 mg by mouth every day.     • metoprolol SR (TOPROL XL) 25 MG TABLET SR 24 HR Take 25 mg by mouth every day.     • magnesium hydroxide (MILK OF MAGNESIA) 400 MG/5ML Suspension Take 30 mL by mouth 2 times a day as needed. Constipation     • polyethylene glycol/lytes (MIRALAX) Pack Take 17 g by mouth every day.     • omeprazole (PRILOSEC) 20 MG delayed-release capsule Take 20 mg by mouth every day.     • sennosides (SENOKOT) 8.6 MG Tab Take 8.6 mg by mouth every day.     • tramadol (ULTRAM) 50 MG Tab Take 50 mg by mouth every 8 hours.     • ondansetron (ZOFRAN ODT) 4 MG TABLET DISPERSIBLE Take 4 mg by mouth every 6 hours as needed for Nausea.         Physical Exam  Hemodynamics  Temp (24hrs), Av.9 °C (96.6 °F), Min:35.9 °C (96.6 °F), Max:35.9 °C (96.6 °F)   Temperature: 35.9 °C (96.6 °F)  Pulse  Av.6  Min: 107  Max: 123    Blood Pressure : (!) 57/41      Respiratory      Respiration: (!) 30, Pulse Oximetry: 94 %     Work Of Breathing / Effort: Moderate;Increased Work of Breathing;Shallow       Physical Exam   Constitutional: He is oriented to person, place, and time. No distress.   Elderly   HENT:   Head: Normocephalic and atraumatic.   Right Ear: External  ear normal.   Left Ear: External ear normal.   Eyes: EOM are normal. Right eye exhibits no discharge. Left eye exhibits no discharge.   Neck: Neck supple. No JVD present.   Cardiovascular: Normal rate, regular rhythm and normal heart sounds.   Pulmonary/Chest: Effort normal and breath sounds normal. No respiratory distress. He exhibits no tenderness.   Abdominal: Soft. Bowel sounds are normal. He exhibits no distension. There is no tenderness.   Musculoskeletal:         General: No edema.   Neurological: He is alert and oriented to person, place, and time. No cranial nerve deficit.   Skin: Skin is dry. He is not diaphoretic. No erythema. There is pallor.   Psychiatric: He has a normal mood and affect. His behavior is normal.   Nursing note and vitals reviewed.    Capillary refill less than 3 seconds, distal pulses intact    Laboratory:  Recent Labs     01/30/20  0000   WBC 24.7*   RBC 5.62   HEMOGLOBIN 16.6   HEMATOCRIT 52.4*   MCV 93.2   MCH 29.5   MCHC 31.7*   RDW 50.5*   PLATELETCT 322   MPV 11.2     Recent Labs     01/30/20  0000   SODIUM 135   POTASSIUM 4.0   CHLORIDE 92*   CO2 12*   GLUCOSE 192*   BUN 36*   CREATININE 2.26*   CALCIUM 10.3     Recent Labs     01/30/20  0000   ALTSGPT 6   ASTSGOT 20   ALKPHOSPHAT 81   TBILIRUBIN 0.9   LIPASE 22   GLUCOSE 192*     Recent Labs     01/30/20  0016   APTT 30.9   INR 1.29*             Lab Results   Component Value Date    TROPONINI <0.01 02/15/2018       Imaging  Dx-chest-portable (1 View)    Result Date: 1/30/2020 1/30/2020 12:27 AM HISTORY/REASON FOR EXAM: Chest Pain. 4 days of coffee grounds emesis TECHNIQUE/EXAM DESCRIPTION AND NUMBER OF VIEWS: Single AP view of the chest. COMPARISON: 12/8/2019 FINDINGS: Hardware: No change. Right transsubclavian pacer is present. The generator obscures underlying structures. Lungs: Extensive reticular opacity is without significant change Pleura:  No pleural space process is seen. Heart and mediastinum: There is stable cardiac  silhouette enlargement.     Stable interstitial lung disease, chronic obstructive pulmonary disease and cardiac silhouette enlargement        Assessment/Plan:  Anticipate that patient will need greater than 2 midnights for management of the discussed medical issues.    * Sepsis ([facility])  Assessment & Plan  This is Sepsis Present on admission  SIRS criteria identified on my evaluation include: Tachycardia, with heart rate greater than 90 BPM and Leukocyosis, with WBC greater than 12,000  Source is unknown  Sepsis protocol initiated  Fluid resuscitation ordered per protocol  IV antibiotics as appropriate for source of sepsis  While organ dysfunction may be noted elsewhere in this problem list or in the chart, degree of organ dysfunction does not meet CMS criteria for severe sepsis    Sepsis is suspected.  Patient has profound leukocytosis although this is confounded by dehydration, he is hypotensive, tachycardic, and he has acute kidney injury.  UA and lactic acid are both pending.  Chest x-ray shows no clear evidence of infiltrates or consolidations.  Patient himself denies any symptoms related to sepsis such as rhinorrhea, cough, shortness of breath, or diarrhea.  He has had dark vomit however.  We will start him on goal-directed therapy with IV fluid hydration and vasopressors as needed.  He will be on empiric antibiotics with IV ceftriaxone and will monitor blood cultures.  Of note, he in 2017 he did have a episode of strep viridans bacteremia.    HEDY (acute kidney injury) ([facility])  Assessment & Plan  Check urine electrolytes, suspect that this is prerenal azotemia.  Continue IV fluids and monitor.    Hematemesis  Assessment & Plan  Occult blood positive for his hematemesis.  Hemoglobin is stable but the patient is persistently hypotensive although he also has evidence of sepsis of unclear source.  Patient will be kept n.p.o., he will be continued on IV Protonix and will continue to trend hemoglobin levels.  Pending  this trend, he may require a GI consultation at some point during this hospitalization.    Elevated troponin- (present on admission)  Assessment & Plan  Likely demand ischemia as well as poor renal clearance.  Continue to monitor on telemetry, trend troponin levels.    Seizure (HCC)  Assessment & Plan  Resume home Keppra and gabapentin.    Paroxysmal atrial fibrillation (HCC)- (present on admission)  Assessment & Plan  Holding Eliquis in light of his hematemesis, continue home metoprolol and monitor closely on telemetry.      Prophylaxis: Sequential compression devices for DVT prophylaxis,  PPI indicated, bowel protocol as needed

## 2020-02-24 ENCOUNTER — TELEPHONE (OUTPATIENT)
Dept: INTERNAL MEDICINE | Facility: CLINIC | Age: 50
End: 2020-02-24

## 2020-02-24 NOTE — TELEPHONE ENCOUNTER
----- Message from JOSSY Ybarra sent at 2/24/2020 12:36 PM EST -----  breo 1 puff once daily 100/25, if needed can increase dose  ----- Message -----  From: Riley Gaston  Sent: 2/24/2020   9:35 AM EST  To: JOSSY Ybarra    Patient saw pulmonary and they prescribed Dulera which is not covered by insurance. Patient would like to go ahead and try Breo since it is a covered medication.    She wants you to prescribe it. Says pulmonary doctor didn't do much for her and doesn't plan on going back.    Please advise

## 2020-04-07 ENCOUNTER — TELEPHONE (OUTPATIENT)
Dept: INTERNAL MEDICINE | Facility: CLINIC | Age: 50
End: 2020-04-07

## 2020-04-07 NOTE — TELEPHONE ENCOUNTER
RX SENT TO PHARMACY    ----- Message from JOSSY Ybarra sent at 4/7/2020 11:16 AM EDT -----  Regarding: RE: ASTHMA INHALER  Contact: [phone]  Breo did not work as well. Advair and symbicort help with breathing but don't get rid of cough. Cough is worse with symbicort and breo. Pt wants to go back to Advair, please send med. 115/21 2 puffs twice daily. Give 3 month supply with refill.   Since being on Dulera she feels like her throat is narrowing, it is intermittent and not associated with taking the med.   She will add benadryl at night.   She does swish and spit.  She will go to ER with any s/s of mouth or tongue swelling.   She will see pulm when able.     Waiting on PFTs through pulm.   ----- Message -----  From: Destiney Best MA  Sent: 4/6/2020  12:34 PM EDT  To: JOSSY Ybarra  Subject: FW: ASTHMA INHALER                               PT CALLED REQUESTING AN INHALER THAT HELPS WITH HER COUGH. SHE KNOWS YOU WONT BE IN UNTIL TOMORROW. SHE HAS 4 DAYS OF THE DULERA INHALER LEFT.  ----- Message -----  From: Sadie Husain  Sent: 4/6/2020  11:16 AM EDT  To: Destiney Best MA  Subject: ASTHMA INHALER                                   Riley has been working with this patient to get an inhaler for her asthma. She would like to have Annie or someone call her to discuss this. She is not on MyChart and unable to do a video visit. Please call her at [phone]. Thanks

## 2020-04-27 DIAGNOSIS — K91.1 DUMPING SYNDROME: ICD-10-CM

## 2020-04-27 RX ORDER — CHOLESTYRAMINE 4 G/9G
1 POWDER, FOR SUSPENSION ORAL DAILY PRN
Qty: 90 PACKET | Refills: 1 | Status: SHIPPED | OUTPATIENT
Start: 2020-04-27 | End: 2020-10-13

## 2020-06-08 DIAGNOSIS — E55.9 VITAMIN D DEFICIENCY: ICD-10-CM

## 2020-06-08 DIAGNOSIS — Z00.00 HEALTH CARE MAINTENANCE: Primary | ICD-10-CM

## 2020-06-08 DIAGNOSIS — E78.5 HYPERLIPIDEMIA, UNSPECIFIED HYPERLIPIDEMIA TYPE: ICD-10-CM

## 2020-06-09 RX ORDER — MONTELUKAST SODIUM 10 MG/1
TABLET ORAL
Qty: 90 TABLET | Refills: 0 | Status: SHIPPED | OUTPATIENT
Start: 2020-06-09 | End: 2020-07-15 | Stop reason: ALTCHOICE

## 2020-06-11 LAB
ALBUMIN SERPL-MCNC: 4.5 G/DL (ref 3.8–4.9)
ALBUMIN/GLOB SERPL: 2 {RATIO} (ref 1.2–2.2)
ALP SERPL-CCNC: 65 IU/L (ref 39–117)
ALT SERPL-CCNC: 21 IU/L (ref 0–32)
AST SERPL-CCNC: 21 IU/L (ref 0–40)
BILIRUB SERPL-MCNC: 0.3 MG/DL (ref 0–1.2)
BUN SERPL-MCNC: 11 MG/DL (ref 6–24)
BUN/CREAT SERPL: 13 (ref 9–23)
CALCIUM SERPL-MCNC: 9.3 MG/DL (ref 8.7–10.2)
CHLORIDE SERPL-SCNC: 103 MMOL/L (ref 96–106)
CO2 SERPL-SCNC: 22 MMOL/L (ref 20–29)
CREAT SERPL-MCNC: 0.88 MG/DL (ref 0.57–1)
GLOBULIN SER CALC-MCNC: 2.2 G/DL (ref 1.5–4.5)
GLUCOSE SERPL-MCNC: 88 MG/DL (ref 65–99)
POTASSIUM SERPL-SCNC: 4.4 MMOL/L (ref 3.5–5.2)
PROT SERPL-MCNC: 6.7 G/DL (ref 6–8.5)
SODIUM SERPL-SCNC: 142 MMOL/L (ref 134–144)
T3FREE SERPL-MCNC: 2.6 PG/ML (ref 2–4.4)
T4 FREE SERPL-MCNC: 1.6 NG/DL (ref 0.82–1.77)
THYROGLOB SERPL-MCNC: <0.2 NG/ML (ref 1.5–38.5)
TSH SERPL DL<=0.005 MIU/L-ACNC: 2.05 UIU/ML (ref 0.45–4.5)

## 2020-07-14 LAB
25(OH)D3+25(OH)D2 SERPL-MCNC: 34.4 NG/ML (ref 30–100)
ALBUMIN SERPL-MCNC: 4.6 G/DL (ref 3.5–5.2)
ALBUMIN/GLOB SERPL: 2.2 G/DL
ALP SERPL-CCNC: 56 U/L (ref 39–117)
ALT SERPL-CCNC: 27 U/L (ref 1–33)
AST SERPL-CCNC: 18 U/L (ref 1–32)
BASOPHILS # BLD AUTO: 0.07 10*3/MM3 (ref 0–0.2)
BASOPHILS NFR BLD AUTO: 0.8 % (ref 0–1.5)
BILIRUB SERPL-MCNC: 0.3 MG/DL (ref 0–1.2)
BUN SERPL-MCNC: 12 MG/DL (ref 6–20)
BUN/CREAT SERPL: 12.6 (ref 7–25)
CALCIUM SERPL-MCNC: 8.5 MG/DL (ref 8.6–10.5)
CHLORIDE SERPL-SCNC: 106 MMOL/L (ref 98–107)
CHOLEST SERPL-MCNC: 178 MG/DL (ref 0–200)
CHOLEST/HDLC SERPL: 3.56 {RATIO}
CO2 SERPL-SCNC: 22.9 MMOL/L (ref 22–29)
CREAT SERPL-MCNC: 0.95 MG/DL (ref 0.57–1)
EOSINOPHIL # BLD AUTO: 0.21 10*3/MM3 (ref 0–0.4)
EOSINOPHIL NFR BLD AUTO: 2.4 % (ref 0.3–6.2)
ERYTHROCYTE [DISTWIDTH] IN BLOOD BY AUTOMATED COUNT: 14.3 % (ref 12.3–15.4)
GLOBULIN SER CALC-MCNC: 2.1 GM/DL
GLUCOSE SERPL-MCNC: 99 MG/DL (ref 65–99)
HCT VFR BLD AUTO: 42.9 % (ref 34–46.6)
HDLC SERPL-MCNC: 50 MG/DL (ref 40–60)
HGB BLD-MCNC: 14.2 G/DL (ref 12–15.9)
IMM GRANULOCYTES # BLD AUTO: 0.03 10*3/MM3 (ref 0–0.05)
IMM GRANULOCYTES NFR BLD AUTO: 0.3 % (ref 0–0.5)
LDLC SERPL CALC-MCNC: 111 MG/DL (ref 0–100)
LYMPHOCYTES # BLD AUTO: 2.21 10*3/MM3 (ref 0.7–3.1)
LYMPHOCYTES NFR BLD AUTO: 25.2 % (ref 19.6–45.3)
MCH RBC QN AUTO: 28.3 PG (ref 26.6–33)
MCHC RBC AUTO-ENTMCNC: 33.1 G/DL (ref 31.5–35.7)
MCV RBC AUTO: 85.5 FL (ref 79–97)
MONOCYTES # BLD AUTO: 0.41 10*3/MM3 (ref 0.1–0.9)
MONOCYTES NFR BLD AUTO: 4.7 % (ref 5–12)
NEUTROPHILS # BLD AUTO: 5.83 10*3/MM3 (ref 1.7–7)
NEUTROPHILS NFR BLD AUTO: 66.6 % (ref 42.7–76)
NRBC BLD AUTO-RTO: 0 /100 WBC (ref 0–0.2)
PLATELET # BLD AUTO: 256 10*3/MM3 (ref 140–450)
POTASSIUM SERPL-SCNC: 4.1 MMOL/L (ref 3.5–5.2)
PROT SERPL-MCNC: 6.7 G/DL (ref 6–8.5)
RBC # BLD AUTO: 5.02 10*6/MM3 (ref 3.77–5.28)
SODIUM SERPL-SCNC: 141 MMOL/L (ref 136–145)
TRIGL SERPL-MCNC: 84 MG/DL (ref 0–150)
TSH SERPL DL<=0.005 MIU/L-ACNC: 1.97 UIU/ML (ref 0.27–4.2)
VLDLC SERPL CALC-MCNC: 16.8 MG/DL
WBC # BLD AUTO: 8.76 10*3/MM3 (ref 3.4–10.8)

## 2020-07-15 ENCOUNTER — TELEPHONE (OUTPATIENT)
Dept: INTERNAL MEDICINE | Facility: CLINIC | Age: 50
End: 2020-07-15

## 2020-07-15 ENCOUNTER — OFFICE VISIT (OUTPATIENT)
Dept: INTERNAL MEDICINE | Facility: CLINIC | Age: 50
End: 2020-07-15

## 2020-07-15 VITALS
HEIGHT: 64 IN | BODY MASS INDEX: 25.35 KG/M2 | SYSTOLIC BLOOD PRESSURE: 120 MMHG | WEIGHT: 148.5 LBS | HEART RATE: 101 BPM | TEMPERATURE: 98 F | OXYGEN SATURATION: 97 % | DIASTOLIC BLOOD PRESSURE: 86 MMHG

## 2020-07-15 DIAGNOSIS — Z85.850 HISTORY OF MEDULLARY CARCINOMA OF THYROID: ICD-10-CM

## 2020-07-15 DIAGNOSIS — Z00.00 HEALTH CARE MAINTENANCE: Primary | ICD-10-CM

## 2020-07-15 DIAGNOSIS — E55.9 VITAMIN D DEFICIENCY: ICD-10-CM

## 2020-07-15 PROCEDURE — 99396 PREV VISIT EST AGE 40-64: CPT | Performed by: NURSE PRACTITIONER

## 2020-07-15 RX ORDER — LANSOPRAZOLE 30 MG/1
30 CAPSULE, DELAYED RELEASE ORAL EVERY MORNING
COMMUNITY
Start: 2020-06-17 | End: 2021-05-17 | Stop reason: SDUPTHER

## 2020-07-15 NOTE — TELEPHONE ENCOUNTER
Hep c anti body added and faxed.    ----- Message from JOSSY Ybarra sent at 7/15/2020  9:58 AM EDT -----  Please add on Hep C antibody to blood in lab

## 2020-07-15 NOTE — PROGRESS NOTES
Subjective   Juliana Rodriguez is a 49 y.o. female.     History of Present Illness   The patient is here today for CPE and lab work F/U. Down 8 lbs from December.   She is under a lot of stress, has been hacked.   Feeling well overall, tired.   Hypothyroidism-Pt is doing well with current medication regimen, denies adverse reactions, compliant with medication schedule.     Chest pain 2019, saw cardiology, holter ordered, results nl  Cough/ SOB- seeing pulmonary, asthma, put on Dulera, pt preferred advair,  Went to allergist and said they did not think it was asthma. She has been off the advair and increased lansoprazole to 30 mg daily.     GYN- due at the end of the month  The following portions of the patient's history were reviewed and updated   as appropriate: allergies, current medications, past family history, past medical history, past social history, past surgical history and problem list.    Review of Systems   Constitutional: Positive for fatigue. Negative for chills and fever.   HENT: Negative for ear pain, rhinorrhea and sore throat.    Eyes: Negative.    Respiratory: Positive for cough (improved), shortness of breath (improving) and wheezing (occ).    Cardiovascular: Negative.    Gastrointestinal: Positive for diarrhea (intermittent).   Endocrine: Negative for cold intolerance and heat intolerance.   Genitourinary: Negative for breast discharge, breast lump, breast pain, difficulty urinating, dyspareunia, dysuria, flank pain, frequency, genital sores, hematuria, pelvic pain and urgency.   Musculoskeletal: Positive for back pain.   Skin: Negative.    Allergic/Immunologic: Negative for environmental allergies and food allergies.   Neurological: Negative.    Hematological: Negative.    Psychiatric/Behavioral: Positive for depressed mood. Negative for dysphoric mood and suicidal ideas. The patient is nervous/anxious.        Objective   Physical Exam   Constitutional: She is oriented to person, place, and time.  Vital signs are normal. She appears well-developed and well-nourished. She is cooperative.   Body mass index is 25.49 kg/m².     HENT:   Right Ear: Hearing, tympanic membrane, external ear and ear canal normal.   Left Ear: Hearing, tympanic membrane, external ear and ear canal normal.   Nose: Nose normal.   Mouth/Throat: Uvula is midline, oropharynx is clear and moist and mucous membranes are normal.   Eyes: Pupils are equal, round, and reactive to light. Conjunctivae, EOM and lids are normal.   Neck: Trachea normal and normal range of motion. Carotid bruit is not present. No thyroid mass and no thyromegaly present.   Cardiovascular: Normal rate, regular rhythm, normal heart sounds and normal pulses.   Pulmonary/Chest: Effort normal and breath sounds normal.   Abdominal: Soft. Normal appearance, normal aorta and bowel sounds are normal. There is no hepatosplenomegaly. There is no tenderness.   Musculoskeletal: Normal range of motion.   Lymphadenopathy:     She has no cervical adenopathy.     She has no axillary adenopathy. No inguinal adenopathy noted on the right or left side.        Right: No inguinal and no supraclavicular adenopathy present.        Left: No inguinal and no supraclavicular adenopathy present.   Neurological: She is alert and oriented to person, place, and time. She has normal strength. No cranial nerve deficit or sensory deficit. She displays a negative Romberg sign. GCS eye subscore is 4. GCS verbal subscore is 5. GCS motor subscore is 6.   Reflex Scores:       Patellar reflexes are 2+ on the right side and 2+ on the left side.  Skin: Skin is warm, dry and intact.   Psychiatric: She has a normal mood and affect. Her speech is normal and behavior is normal. Judgment and thought content normal. Cognition and memory are normal.       Vitals:    07/15/20 0924   BP: 120/86   Pulse: 101   Temp: 98 °F (36.7 °C)   SpO2: 97%     Body mass index is 25.49 kg/m².      Assessment/Plan   Juliana was seen  today for annual exam.    Diagnoses and all orders for this visit:    Health care maintenance    Vitamin D deficiency    History of medullary carcinoma of thyroid                 1. Preventative counseling- continue healthy diet and exercise  2. Vit D def- double current intake  3. Hypothyroidism/hx of thyroid ca- UTD with endo, continue synthroid at 150 mcg daily     Shingrix- discussed

## 2020-07-16 LAB
HCV AB S/CO SERPL IA: 0.1 S/CO RATIO (ref 0–0.9)
Lab: NORMAL
WRITTEN AUTHORIZATION: NORMAL

## 2020-07-29 ENCOUNTER — APPOINTMENT (OUTPATIENT)
Dept: WOMENS IMAGING | Facility: HOSPITAL | Age: 50
End: 2020-07-29

## 2020-07-29 PROCEDURE — 77067 SCR MAMMO BI INCL CAD: CPT | Performed by: RADIOLOGY

## 2020-07-29 PROCEDURE — 77063 BREAST TOMOSYNTHESIS BI: CPT | Performed by: RADIOLOGY

## 2020-09-08 RX ORDER — MONTELUKAST SODIUM 10 MG/1
TABLET ORAL
Qty: 90 TABLET | Refills: 0 | OUTPATIENT
Start: 2020-09-08

## 2020-10-12 DIAGNOSIS — K91.1 DUMPING SYNDROME: ICD-10-CM

## 2020-10-13 RX ORDER — CHOLESTYRAMINE 4 G/9G
1 POWDER, FOR SUSPENSION ORAL DAILY PRN
Qty: 90 PACKET | Refills: 1 | Status: SHIPPED | OUTPATIENT
Start: 2020-10-13 | End: 2021-04-12

## 2021-03-26 ENCOUNTER — BULK ORDERING (OUTPATIENT)
Dept: CASE MANAGEMENT | Facility: OTHER | Age: 51
End: 2021-03-26

## 2021-03-26 DIAGNOSIS — Z23 IMMUNIZATION DUE: ICD-10-CM

## 2021-04-09 DIAGNOSIS — K91.1 DUMPING SYNDROME: ICD-10-CM

## 2021-04-12 RX ORDER — CHOLESTYRAMINE 4 G/9G
1 POWDER, FOR SUSPENSION ORAL DAILY PRN
Qty: 90 PACKET | Refills: 0 | Status: SHIPPED | OUTPATIENT
Start: 2021-04-12 | End: 2021-05-17 | Stop reason: SDUPTHER

## 2021-04-29 ENCOUNTER — TELEPHONE (OUTPATIENT)
Dept: INTERNAL MEDICINE | Facility: CLINIC | Age: 51
End: 2021-04-29

## 2021-04-29 DIAGNOSIS — E78.5 HYPERLIPIDEMIA, UNSPECIFIED HYPERLIPIDEMIA TYPE: ICD-10-CM

## 2021-04-29 DIAGNOSIS — R53.83 FATIGUE, UNSPECIFIED TYPE: ICD-10-CM

## 2021-04-29 DIAGNOSIS — Z00.00 HEALTH CARE MAINTENANCE: Primary | ICD-10-CM

## 2021-04-29 DIAGNOSIS — E55.9 VITAMIN D DEFICIENCY: ICD-10-CM

## 2021-04-29 NOTE — TELEPHONE ENCOUNTER
----- Message from JOSSY Ybarra sent at 4/29/2021  3:29 PM EDT -----  CBC, CMP, LP, TSH, Vit D  ----- Message -----  From: Felicia Mackay  Sent: 4/29/2021   3:23 PM EDT  To: JOSSY Ybarra    PLEASE ADVISE FOR LABS

## 2021-04-30 LAB
25(OH)D3+25(OH)D2 SERPL-MCNC: 69.7 NG/ML (ref 30–100)
ALBUMIN SERPL-MCNC: 4.5 G/DL (ref 3.5–5.2)
ALBUMIN/GLOB SERPL: 2.1 G/DL
ALP SERPL-CCNC: 63 U/L (ref 39–117)
ALT SERPL-CCNC: 27 U/L (ref 1–33)
AST SERPL-CCNC: 16 U/L (ref 1–32)
BASOPHILS # BLD AUTO: 0.07 10*3/MM3 (ref 0–0.2)
BASOPHILS NFR BLD AUTO: 0.8 % (ref 0–1.5)
BILIRUB SERPL-MCNC: 0.3 MG/DL (ref 0–1.2)
BUN SERPL-MCNC: 8 MG/DL (ref 6–20)
BUN/CREAT SERPL: 9.9 (ref 7–25)
CALCIUM SERPL-MCNC: 9.3 MG/DL (ref 8.6–10.5)
CHLORIDE SERPL-SCNC: 103 MMOL/L (ref 98–107)
CHOLEST SERPL-MCNC: 183 MG/DL (ref 0–200)
CHOLEST/HDLC SERPL: 3.73 {RATIO}
CO2 SERPL-SCNC: 23.4 MMOL/L (ref 22–29)
CREAT SERPL-MCNC: 0.81 MG/DL (ref 0.57–1)
EOSINOPHIL # BLD AUTO: 0.42 10*3/MM3 (ref 0–0.4)
EOSINOPHIL NFR BLD AUTO: 4.9 % (ref 0.3–6.2)
ERYTHROCYTE [DISTWIDTH] IN BLOOD BY AUTOMATED COUNT: 13.6 % (ref 12.3–15.4)
GLOBULIN SER CALC-MCNC: 2.1 GM/DL
GLUCOSE SERPL-MCNC: 84 MG/DL (ref 65–99)
HCT VFR BLD AUTO: 42.3 % (ref 34–46.6)
HDLC SERPL-MCNC: 49 MG/DL (ref 40–60)
HGB BLD-MCNC: 14.2 G/DL (ref 12–15.9)
IMM GRANULOCYTES # BLD AUTO: 0.05 10*3/MM3 (ref 0–0.05)
IMM GRANULOCYTES NFR BLD AUTO: 0.6 % (ref 0–0.5)
LDLC SERPL CALC-MCNC: 95 MG/DL (ref 0–100)
LYMPHOCYTES # BLD AUTO: 2.77 10*3/MM3 (ref 0.7–3.1)
LYMPHOCYTES NFR BLD AUTO: 32.6 % (ref 19.6–45.3)
MCH RBC QN AUTO: 28.3 PG (ref 26.6–33)
MCHC RBC AUTO-ENTMCNC: 33.6 G/DL (ref 31.5–35.7)
MCV RBC AUTO: 84.3 FL (ref 79–97)
MONOCYTES # BLD AUTO: 0.49 10*3/MM3 (ref 0.1–0.9)
MONOCYTES NFR BLD AUTO: 5.8 % (ref 5–12)
NEUTROPHILS # BLD AUTO: 4.69 10*3/MM3 (ref 1.7–7)
NEUTROPHILS NFR BLD AUTO: 55.3 % (ref 42.7–76)
NRBC BLD AUTO-RTO: 0 /100 WBC (ref 0–0.2)
PLATELET # BLD AUTO: 245 10*3/MM3 (ref 140–450)
POTASSIUM SERPL-SCNC: 4.1 MMOL/L (ref 3.5–5.2)
PROT SERPL-MCNC: 6.6 G/DL (ref 6–8.5)
RBC # BLD AUTO: 5.02 10*6/MM3 (ref 3.77–5.28)
SODIUM SERPL-SCNC: 140 MMOL/L (ref 136–145)
T4 FREE SERPL-MCNC: 1.84 NG/DL (ref 0.93–1.7)
TRIGL SERPL-MCNC: 234 MG/DL (ref 0–150)
TSH SERPL DL<=0.005 MIU/L-ACNC: 0.06 UIU/ML (ref 0.27–4.2)
VLDLC SERPL CALC-MCNC: 39 MG/DL (ref 5–40)
WBC # BLD AUTO: 8.49 10*3/MM3 (ref 3.4–10.8)

## 2021-05-17 ENCOUNTER — OFFICE VISIT (OUTPATIENT)
Dept: INTERNAL MEDICINE | Facility: CLINIC | Age: 51
End: 2021-05-17

## 2021-05-17 VITALS
BODY MASS INDEX: 26.1 KG/M2 | HEIGHT: 64 IN | OXYGEN SATURATION: 97 % | SYSTOLIC BLOOD PRESSURE: 120 MMHG | WEIGHT: 152.9 LBS | HEART RATE: 106 BPM | DIASTOLIC BLOOD PRESSURE: 82 MMHG | TEMPERATURE: 98 F

## 2021-05-17 DIAGNOSIS — E03.9 HYPOTHYROIDISM, UNSPECIFIED TYPE: ICD-10-CM

## 2021-05-17 DIAGNOSIS — K21.9 GASTROESOPHAGEAL REFLUX DISEASE, UNSPECIFIED WHETHER ESOPHAGITIS PRESENT: ICD-10-CM

## 2021-05-17 DIAGNOSIS — R51.9 GENERALIZED HEADACHES: ICD-10-CM

## 2021-05-17 DIAGNOSIS — K91.1 DUMPING SYNDROME: ICD-10-CM

## 2021-05-17 DIAGNOSIS — R06.83 SNORING: ICD-10-CM

## 2021-05-17 DIAGNOSIS — Z00.00 HEALTH CARE MAINTENANCE: Primary | ICD-10-CM

## 2021-05-17 DIAGNOSIS — E78.5 HYPERLIPIDEMIA, UNSPECIFIED HYPERLIPIDEMIA TYPE: ICD-10-CM

## 2021-05-17 PROCEDURE — 99396 PREV VISIT EST AGE 40-64: CPT | Performed by: NURSE PRACTITIONER

## 2021-05-17 RX ORDER — B-COMPLEX WITH VITAMIN C
TABLET ORAL
COMMUNITY

## 2021-05-17 RX ORDER — CHOLESTYRAMINE 4 G/9G
1 POWDER, FOR SUSPENSION ORAL DAILY PRN
Qty: 90 PACKET | Refills: 3 | Status: SHIPPED | OUTPATIENT
Start: 2021-05-17 | End: 2022-05-26

## 2021-05-17 RX ORDER — LANSOPRAZOLE 30 MG/1
30 CAPSULE, DELAYED RELEASE ORAL EVERY MORNING
Qty: 90 CAPSULE | Refills: 3 | Status: SHIPPED | OUTPATIENT
Start: 2021-05-17 | End: 2022-04-28

## 2021-05-17 NOTE — PROGRESS NOTES
Subjective   Juliana Rodriguez is a 50 y.o. female.     History of Present Illness   The patient is here today for CPE and lab work F/U. Patient is overall doing well.     Dumping syndrome- Pt is doing well with current medication regimen, denies adverse reactions, compliant with medication schedule.   Patient states diarrhea it is well controlled.     Hypothyroidism- go to endocrine next week. Pt is doing well with current medication regimen, denies adverse reactions, compliant with medication schedule.     GERD-  Pt is doing well with current medication regimen, denies adverse reactions, compliant with medication schedule.       Chest pain 2019, saw cardiology, holter ordered, results nl  Cough/ SOB- seeing pulmonary, asthma, put on Dulera, pt preferred advair,  Went to allergist and said they did not think it was asthma. She has been off the advair and increased lansoprazole to 30 mg daily.     Headaches - occurs in the morning when she wakes up, localized to the forehead.   Just got back from vacation - sun screened used   The following portions of the patient's history were reviewed and updated as appropriate: allergies, current medications, past family history, past medical history, past social history, past surgical history and problem list.    Review of Systems   Constitutional: Negative for chills, fatigue and fever.   HENT: Positive for rhinorrhea. Negative for ear pain and sore throat.    Eyes: Negative.    Respiratory: Negative for cough and shortness of breath.    Cardiovascular: Negative.    Gastrointestinal: Positive for diarrhea. Negative for GERD.   Endocrine: Negative for cold intolerance and heat intolerance.   Genitourinary: Negative for breast discharge, breast lump, breast pain, difficulty urinating, dyspareunia, dysuria, flank pain, frequency, genital sores, hematuria, pelvic pain and urgency.   Musculoskeletal: Positive for back pain.   Skin: Negative.    Allergic/Immunologic: Positive for  environmental allergies. Negative for food allergies.   Neurological: Positive for headache (in morning ).   Hematological: Negative.    Psychiatric/Behavioral: Negative for dysphoric mood and suicidal ideas. The patient is not nervous/anxious.        Objective   Physical Exam  Constitutional:       Appearance: Normal appearance. She is well-developed.      Comments: Body mass index is 26.23 kg/m².     HENT:      Right Ear: Hearing, tympanic membrane, ear canal and external ear normal.      Left Ear: Hearing, tympanic membrane, ear canal and external ear normal.   Eyes:      General: Lids are normal.      Conjunctiva/sclera: Conjunctivae normal.      Pupils: Pupils are equal, round, and reactive to light.   Neck:      Thyroid: No thyroid mass or thyromegaly.      Vascular: No carotid bruit.      Trachea: Trachea normal.   Cardiovascular:      Rate and Rhythm: Normal rate and regular rhythm.      Pulses: Normal pulses.      Heart sounds: Normal heart sounds.   Pulmonary:      Effort: Pulmonary effort is normal.      Breath sounds: Normal breath sounds.   Abdominal:      General: Bowel sounds are normal.      Palpations: Abdomen is soft.      Tenderness: There is no abdominal tenderness.   Musculoskeletal:         General: Normal range of motion.      Cervical back: Normal range of motion.   Lymphadenopathy:      Cervical: No cervical adenopathy.      Upper Body:      Right upper body: No supraclavicular adenopathy.      Left upper body: No supraclavicular adenopathy.      Lower Body: No right inguinal adenopathy. No left inguinal adenopathy.   Skin:     General: Skin is warm and dry.   Neurological:      Mental Status: She is alert and oriented to person, place, and time.      GCS: GCS eye subscore is 4. GCS verbal subscore is 5. GCS motor subscore is 6.      Cranial Nerves: No cranial nerve deficit.      Sensory: No sensory deficit.      Deep Tendon Reflexes:      Reflex Scores:       Patellar reflexes are 2+ on the  right side and 2+ on the left side.  Psychiatric:         Speech: Speech normal.         Behavior: Behavior normal. Behavior is cooperative.         Thought Content: Thought content normal.         Judgment: Judgment normal.         Vitals:    05/17/21 1607   BP: 120/82   Pulse: 106   Temp: 98 °F (36.7 °C)   SpO2: 97%     Body mass index is 26.23 kg/m².      Assessment/Plan   Diagnoses and all orders for this visit:    1. Health care maintenance (Primary)    2. Hyperlipidemia, unspecified hyperlipidemia type    3. Dumping syndrome  -     cholestyramine (QUESTRAN) 4 g packet; Take 1 packet by mouth Daily As Needed (diarrhea).  Dispense: 90 packet; Refill: 3    4. Hypothyroidism, unspecified type    5. Generalized headaches  -     Ambulatory Referral to Sleep Medicine    6. Snoring  -     Ambulatory Referral to Sleep Medicine    7. Gastroesophageal reflux disease, unspecified whether esophagitis present  -     lansoprazole (PREVACID) 30 MG capsule; Take 1 capsule by mouth Every Morning.  Dispense: 90 capsule; Refill: 3                 1. CPE - Will start exercising more regularly and continue to eat a well balanced diet.   2. Headaches/snoring - referral to sleep medicine sleep study   3. Hypothyroidism - take half tablet Saturday and Sunday then continue the 150 mcg dose. Will see endocrine and f/u labs with them  4. GERD - well tolerated, continue same  5. Dumping syndrome - well tolerated, continue same     “Discussed risks/benefits to vaccination, reviewed components of the vaccine, discussed VIS, discussed informed consent, informed consent obtained. Patient/Parent was allowed to accept or refuse vaccine. Questions answered to satisfactory state of patient/Parent. We reviewed typical age appropriate and seasonally appropriate vaccinations. Reviewed immunization history and updated state vaccination form as needed. Patient was counseled on COVID

## 2021-06-28 ENCOUNTER — APPOINTMENT (OUTPATIENT)
Dept: WOMENS IMAGING | Facility: HOSPITAL | Age: 51
End: 2021-06-28

## 2021-06-28 PROCEDURE — 77063 BREAST TOMOSYNTHESIS BI: CPT | Performed by: RADIOLOGY

## 2021-06-28 PROCEDURE — 77067 SCR MAMMO BI INCL CAD: CPT | Performed by: RADIOLOGY

## 2021-08-18 NOTE — PROGRESS NOTES
Subjective   Juliana Rodriguez is a 46 y.o. female.     History of Present Illness   The patient is here today with complaints of low back pain starting Tuesday. Also occurred middle of December, better with chiropractic visit. Muscle relaxers tend to help, she does not like pain pills. Does have aching down butt and down to knees. Not losing bowel or bladder function. Does state her 2 lowest discs are desiccated.   Has had back pain chronically since age of 12.     Recently had right thyroid lobectomy 12/29th, was cancerous. They are planning on recheck US to decide if she needs the other lobe removed, not on any meds.     GB removed 12/23rd.  The following portions of the patient's history were reviewed and updated as appropriate: allergies, current medications, past family history, past medical history, past social history, past surgical history and problem list.    Review of Systems   Constitutional: Negative.    Respiratory: Negative.    Cardiovascular: Negative.    Psychiatric/Behavioral: Negative.        Objective   Physical Exam   Constitutional: She appears well-developed and well-nourished.   Neck: Normal range of motion. Neck supple. No thyromegaly present.   Cardiovascular: Normal rate, regular rhythm, normal heart sounds and intact distal pulses.    Pulmonary/Chest: Effort normal and breath sounds normal.   Musculoskeletal:        Lumbar back: She exhibits decreased range of motion and bony tenderness.   Neg straight leg test.   Patient with slow, deliberate, shuffling gait.   Skin: Skin is warm and dry.   Psychiatric: She has a normal mood and affect. Her behavior is normal. Judgment and thought content normal.       Assessment/Plan   Juliana was seen today for pain.    Diagnoses and all orders for this visit:    Acute bilateral low back pain without sciatica  -     meloxicam (MOBIC) 7.5 MG tablet; Take 1 tablet by mouth Daily.  -     cyclobenzaprine (FLEXERIL) 5 MG tablet; Take 1 tablet by mouth At Night  As Needed for muscle spasms.    Lumbar radiculopathy  -     meloxicam (MOBIC) 7.5 MG tablet; Take 1 tablet by mouth Daily.  -     cyclobenzaprine (FLEXERIL) 5 MG tablet; Take 1 tablet by mouth At Night As Needed for muscle spasms.        1. Low back pain- patient would prefer not to use prednisone, given meloxicam, flexeril prn at bedtime.  Patient will see chiropractor on Monday.  We did discuss that her back is flaring up more, recently.  She is open to physical therapy but will call me if she feels like it's necessary.        [Father] : father [2% ___ oz/d] : consumes [unfilled] oz of 2%  milk per day [Fruit] : fruit [Meat] : meat [Vegetables] : vegetables [Grains] : grains [Eggs] : eggs [Dairy] : dairy [Vitamin] : Patient takes vitamin daily [___ stools per day] : [unfilled]  stools per day [Firm] : stools are firm consistency [___ voids per day] : [unfilled] voids per day [Toilet Trained] : toilet trained [Normal] : Normal [In own bed] : In own bed [Brushing teeth] : Brushing teeth [Yes] : Patient goes to dentist yearly [Toothpaste] : Primary Fluoride Source: Toothpaste [Playtime (60 min/d)] : Playtime 60 min a day [< 2 hrs of screen time] : Less than 2 hrs of screen time [Appropiate parent-child-sibling interaction] : Appropriate parent-child-sibling interaction [Child Cooperates] : Child cooperates [Parent has appropriate responses to behavior] : Parent has appropriate responses to behavior [Grade ___] : Grade [unfilled] [No difficulties with Homework] : No difficulties with homework [Adequate performance] : Adequate performance [Adequate attention] : Adequate attention [No] : No cigarette smoke exposure [Water heater temperature set at <120 degrees F] : Water heater temperature set at <120 degrees F [Car seat in back seat] : Car seat in back seat [Carbon Monoxide Detectors] : Carbon monoxide detectors [Smoke Detectors] : Smoke detectors [Supervised outdoor play] : Supervised outdoor play [Gun in Home] : No gun in home

## 2021-08-31 ENCOUNTER — TELEMEDICINE (OUTPATIENT)
Dept: INTERNAL MEDICINE | Facility: CLINIC | Age: 51
End: 2021-08-31

## 2021-08-31 VITALS — BODY MASS INDEX: 25.61 KG/M2 | WEIGHT: 150 LBS | HEIGHT: 64 IN | TEMPERATURE: 98 F

## 2021-08-31 DIAGNOSIS — J01.90 ACUTE NON-RECURRENT SINUSITIS, UNSPECIFIED LOCATION: Primary | ICD-10-CM

## 2021-08-31 PROCEDURE — 99213 OFFICE O/P EST LOW 20 MIN: CPT | Performed by: NURSE PRACTITIONER

## 2021-08-31 RX ORDER — AMOXICILLIN 875 MG/1
875 TABLET, COATED ORAL 2 TIMES DAILY
Qty: 20 TABLET | Refills: 0 | Status: SHIPPED | OUTPATIENT
Start: 2021-08-31 | End: 2022-05-26

## 2021-08-31 NOTE — PROGRESS NOTES
"Subjective   Juliana Rodriguez is a 50 y.o. female.     History of Present Illness    The patient is here today with c/o she thinks she has a sinus infection. Symptoms started about 1 1/2 weeks ago. Started with jaw pain. Congested, stopped up, runny nose, PND, sore throat and ear pain.     Taking tylenol, ibuprofen and nyquil at night. Not taking antihistamines. Has not started mucinex.    She did have a COVID swab Sunday which was neg at [facility]. It was not a rapid swab.   The following portions of the patient's history were reviewed and updated as appropriate: allergies, current medications, past family history, past medical history, past social history, past surgical history and problem list.    Review of Systems   Constitutional: Positive for fatigue. Negative for chills and fever.   HENT: Positive for ear pain, postnasal drip, rhinorrhea, sneezing and sore throat. Negative for sinus pressure.    Respiratory: Positive for cough (dry), shortness of breath (with over exertion only ) and wheezing (\"a little bit\").    Cardiovascular: Positive for chest pain (3 days in a row at night, now gone). Negative for palpitations.       Objective   Physical Exam  Neurological:      General: No focal deficit present.      Mental Status: She is alert.   Psychiatric:         Mood and Affect: Mood normal.         Behavior: Behavior normal.         Thought Content: Thought content normal.         Judgment: Judgment normal.         Vitals:    08/31/21 1057   Temp: 98 °F (36.7 °C)     Body mass index is 25.75 kg/m².      Assessment/Plan   Diagnoses and all orders for this visit:    1. Acute non-recurrent sinusitis, unspecified location (Primary)  -     amoxicillin (AMOXIL) 875 MG tablet; Take 1 tablet by mouth 2 (Two) Times a Day.  Dispense: 20 tablet; Refill: 0           Visit via telephone today, pt unable to work epic huber. Pt consents, visit lasted approx 15 min.       1. Sinusitis- start antihistamine and mucinex, will send in amoxil " in case of continued or worsening symptoms, probiotic  Stay home/rest, neg COVID swab

## 2021-09-17 ENCOUNTER — TELEPHONE (OUTPATIENT)
Dept: INTERNAL MEDICINE | Facility: CLINIC | Age: 51
End: 2021-09-17

## 2021-09-17 NOTE — TELEPHONE ENCOUNTER
Caller: Michael Juliana    Relationship: Self    Best call back number: [phone] (M)    What is the best time to reach you: ANYTIME    Who are you requesting to speak with (clinical staff, provider,  specific staff member): CLINICAL STAFF    Do you know the name of the person who called:     What was the call regarding: FLU SHOT REQUEST     Do you require a callback: YES        THANKS

## 2022-01-26 ENCOUNTER — TELEPHONE (OUTPATIENT)
Dept: INTERNAL MEDICINE | Facility: CLINIC | Age: 52
End: 2022-01-26

## 2022-02-01 RX ORDER — MONTELUKAST SODIUM 4 MG/1
2 TABLET, CHEWABLE ORAL DAILY
Qty: 60 TABLET | Refills: 5 | Status: SHIPPED | OUTPATIENT
Start: 2022-02-01 | End: 2022-07-28

## 2022-04-27 DIAGNOSIS — K21.9 GASTROESOPHAGEAL REFLUX DISEASE, UNSPECIFIED WHETHER ESOPHAGITIS PRESENT: ICD-10-CM

## 2022-04-28 RX ORDER — LANSOPRAZOLE 30 MG/1
CAPSULE, DELAYED RELEASE ORAL
Qty: 90 CAPSULE | Refills: 3 | Status: SHIPPED | OUTPATIENT
Start: 2022-04-28 | End: 2022-07-11

## 2022-05-02 ENCOUNTER — TELEPHONE (OUTPATIENT)
Dept: INTERNAL MEDICINE | Facility: CLINIC | Age: 52
End: 2022-05-02

## 2022-05-02 NOTE — TELEPHONE ENCOUNTER
PATIENT IS WANTING TO GET HER LABS DONE SOON.     IF SHE NEEDS APPT. PLEASE CALL AND ADVISE    Juliana Rodriguez (Self) [phone] (M)     IF NO APPT NEEDED, SHE WILL COME IN DURING THE HOURS GIVEN TO PATIENT

## 2022-05-04 ENCOUNTER — TELEPHONE (OUTPATIENT)
Dept: INTERNAL MEDICINE | Facility: CLINIC | Age: 52
End: 2022-05-04

## 2022-05-26 ENCOUNTER — OFFICE VISIT (OUTPATIENT)
Dept: INTERNAL MEDICINE | Facility: CLINIC | Age: 52
End: 2022-05-26

## 2022-05-26 VITALS
DIASTOLIC BLOOD PRESSURE: 62 MMHG | HEART RATE: 102 BPM | SYSTOLIC BLOOD PRESSURE: 110 MMHG | OXYGEN SATURATION: 97 % | WEIGHT: 159.7 LBS | HEIGHT: 64 IN | RESPIRATION RATE: 18 BRPM | BODY MASS INDEX: 27.26 KG/M2 | TEMPERATURE: 97.1 F

## 2022-05-26 DIAGNOSIS — E66.3 OVERWEIGHT (BMI 25.0-29.9): ICD-10-CM

## 2022-05-26 DIAGNOSIS — E78.5 HYPERLIPIDEMIA, UNSPECIFIED HYPERLIPIDEMIA TYPE: ICD-10-CM

## 2022-05-26 DIAGNOSIS — Z00.00 HEALTH CARE MAINTENANCE: Primary | ICD-10-CM

## 2022-05-26 DIAGNOSIS — Z85.850 HISTORY OF MEDULLARY CARCINOMA OF THYROID: ICD-10-CM

## 2022-05-26 DIAGNOSIS — E01.0 THYROMEGALY: ICD-10-CM

## 2022-05-26 PROCEDURE — 99396 PREV VISIT EST AGE 40-64: CPT | Performed by: NURSE PRACTITIONER

## 2022-05-26 RX ORDER — MULTIPLE VITAMINS W/ MINERALS TAB 9MG-400MCG
1 TAB ORAL DAILY
COMMUNITY

## 2022-05-26 RX ORDER — LEVOTHYROXINE SODIUM 13 UG/1
150 CAPSULE ORAL DAILY
Qty: 30 CAPSULE | Refills: 0
Start: 2022-05-26

## 2022-05-26 RX ORDER — LEVOTHYROXINE SODIUM 150 UG/1
150 CAPSULE ORAL DAILY
Qty: 30 CAPSULE
Start: 2022-05-26 | End: 2022-05-26

## 2022-05-26 NOTE — PROGRESS NOTES
Subjective   Juliana Rodriguez is a 51 y.o. female.     History of Present Illness   The patient is here today for CPE and lab work F/U. Feeling ok, dealing with thyroid stuff, tired.     Hypothyroidism- now on tirosint 150 mcg daily, labs are out of whack, seeing endo     Periods are still going, slightly irregular.     Dtr Alli.   The following portions of the patient's history were reviewed and updated as appropriate: allergies, current medications, past family history, past medical history, past social history, past surgical history and problem list.    Review of Systems   Constitutional: Positive for fatigue. Negative for chills and fever.   HENT: Negative for ear pain, rhinorrhea and sore throat.    Eyes: Negative.    Respiratory: Negative for cough and shortness of breath.    Cardiovascular: Negative.    Gastrointestinal: Positive for diarrhea (intermittently).   Endocrine: Negative for cold intolerance and heat intolerance.   Genitourinary: Negative for breast discharge, breast lump, breast pain, difficulty urinating, dyspareunia, dysuria, flank pain, frequency, genital sores, hematuria, pelvic pain and urgency.   Musculoskeletal: Positive for back pain (does home exercises ).   Skin: Negative.    Allergic/Immunologic: Negative for environmental allergies and food allergies.   Neurological: Positive for headache (right before her period).   Hematological: Negative.    Psychiatric/Behavioral: Negative for dysphoric mood and suicidal ideas. The patient is not nervous/anxious.        Objective   Physical Exam  Constitutional:       Appearance: Normal appearance. She is well-developed.      Comments: Body mass index is 27.4 kg/m².     HENT:      Right Ear: Hearing, tympanic membrane, ear canal and external ear normal.      Left Ear: Hearing, tympanic membrane, ear canal and external ear normal.   Eyes:      General: Lids are normal.      Conjunctiva/sclera: Conjunctivae normal.      Pupils: Pupils are equal,  round, and reactive to light.   Neck:      Thyroid: Thyromegaly present. No thyroid mass.      Vascular: No carotid bruit.      Trachea: Trachea normal.   Cardiovascular:      Rate and Rhythm: Normal rate and regular rhythm.      Pulses: Normal pulses.      Heart sounds: Normal heart sounds.   Pulmonary:      Effort: Pulmonary effort is normal.      Breath sounds: Normal breath sounds.   Chest:   Breasts:      Right: No supraclavicular adenopathy.      Left: No supraclavicular adenopathy.       Abdominal:      General: Bowel sounds are normal.      Palpations: Abdomen is soft.      Tenderness: There is no abdominal tenderness.   Musculoskeletal:         General: Normal range of motion.      Cervical back: Normal range of motion.   Lymphadenopathy:      Cervical: No cervical adenopathy.      Upper Body:      Right upper body: No supraclavicular adenopathy.      Left upper body: No supraclavicular adenopathy.      Lower Body: No right inguinal adenopathy. No left inguinal adenopathy.   Skin:     General: Skin is warm and dry.   Neurological:      Mental Status: She is alert and oriented to person, place, and time.      GCS: GCS eye subscore is 4. GCS verbal subscore is 5. GCS motor subscore is 6.      Cranial Nerves: No cranial nerve deficit.      Sensory: No sensory deficit.      Deep Tendon Reflexes:      Reflex Scores:       Patellar reflexes are 2+ on the right side and 2+ on the left side.  Psychiatric:         Speech: Speech normal.         Behavior: Behavior normal. Behavior is cooperative.         Thought Content: Thought content normal.         Judgment: Judgment normal.         Vitals:    05/26/22 1418   BP: 110/62   Pulse: 102   Resp: 18   Temp: 97.1 °F (36.2 °C)   SpO2: 97%     Body mass index is 27.4 kg/m².    Lab on 10/27/2021   Component Date Value Ref Range Status   • 25 Hydroxy, Vitamin D 05/04/2022 32.1  30.0 - 100.0 ng/mL Final    Comment: Vitamin D deficiency has been defined by the Surveyor  of  Medicine and an Endocrine Society practice guideline as a  level of serum 25-OH vitamin D less than 20 ng/mL (1,2).  The Endocrine Society went on to further define vitamin D  insufficiency as a level between 21 and 29 ng/mL (2).  1. IOM ([place] [employer]). 2010. Dietary reference     intakes for calcium and D. [place]: The     National Academies Press.  2. Kassi MF, George NC, Cassandra PEDRO, et al.     Evaluation, treatment, and prevention of vitamin D     deficiency: an Endocrine Society clinical practice     guideline. JCEM. 2011 Jul; 96(7):1911-30.     • WBC 05/04/2022 8.5  3.4 - 10.8 x10E3/uL Final   • RBC 05/04/2022 5.04  3.77 - 5.28 x10E6/uL Final   • Hemoglobin 05/04/2022 13.8  11.1 - 15.9 g/dL Final   • Hematocrit 05/04/2022 41.8  34.0 - 46.6 % Final   • MCV 05/04/2022 83  79 - 97 fL Final   • MCH 05/04/2022 27.4  26.6 - 33.0 pg Final   • MCHC 05/04/2022 33.0  31.5 - 35.7 g/dL Final   • RDW 05/04/2022 13.6  11.7 - 15.4 % Final   • Platelets 05/04/2022 286  150 - 450 x10E3/uL Final   • Neutrophil Rel % 05/04/2022 54  Not Estab. % Final   • Lymphocyte Rel % 05/04/2022 34  Not Estab. % Final   • Monocyte Rel % 05/04/2022 5  Not Estab. % Final   • Eosinophil Rel % 05/04/2022 6  Not Estab. % Final   • Basophil Rel % 05/04/2022 1  Not Estab. % Final   • Neutrophils Absolute 05/04/2022 4.6  1.4 - 7.0 x10E3/uL Final   • Lymphocytes Absolute 05/04/2022 2.9  0.7 - 3.1 x10E3/uL Final   • Monocytes Absolute 05/04/2022 0.4  0.1 - 0.9 x10E3/uL Final   • Eosinophils Absolute 05/04/2022 0.5 (A) 0.0 - 0.4 x10E3/uL Final   • Basophils Absolute 05/04/2022 0.1  0.0 - 0.2 x10E3/uL Final   • Immature Granulocyte Rel % 05/04/2022 0  Not Estab. % Final   • Immature Grans Absolute 05/04/2022 0.0  0.0 - 0.1 x10E3/uL Final   • TSH 05/04/2022 0.097 (A) 0.450 - 4.500 uIU/mL Final   • Total Cholesterol 05/04/2022 212 (A) 100 - 199 mg/dL Final   • Triglycerides 05/04/2022 332 (A) 0 - 149 mg/dL Final   • HDL  Cholesterol 05/04/2022 41  >39 mg/dL Final   • VLDL Cholesterol Isiah 05/04/2022 58 (A) 5 - 40 mg/dL Final   • LDL Chol Calc (NIH) 05/04/2022 113 (A) 0 - 99 mg/dL Final   • Chol/HDL Ratio 05/04/2022 5.2 (A) 0.0 - 4.4 ratio Final    Comment:                                   T. Chol/HDL Ratio                                              Men  Women                                1/2 Avg.Risk  3.4    3.3                                    Avg.Risk  5.0    4.4                                 2X Avg.Risk  9.6    7.1                                 3X Avg.Risk 23.4   11.0     • Glucose 05/04/2022 111 (A) 65 - 99 mg/dL Final   • BUN 05/04/2022 9  6 - 24 mg/dL Final   • Creatinine 05/04/2022 0.74  0.57 - 1.00 mg/dL Final   • EGFR Result 05/04/2022 97  >59 mL/min/1.73 Final   • BUN/Creatinine Ratio 05/04/2022 12  9 - 23 Final   • Sodium 05/04/2022 141  134 - 144 mmol/L Final   • Potassium 05/04/2022 4.3  3.5 - 5.2 mmol/L Final   • Chloride 05/04/2022 105  96 - 106 mmol/L Final   • Total CO2 05/04/2022 21  20 - 29 mmol/L Final   • Calcium 05/04/2022 9.0  8.7 - 10.2 mg/dL Final   • Total Protein 05/04/2022 6.7  6.0 - 8.5 g/dL Final   • Albumin 05/04/2022 4.3  3.8 - 4.9 g/dL Final   • Globulin 05/04/2022 2.4  1.5 - 4.5 g/dL Final   • A/G Ratio 05/04/2022 1.8  1.2 - 2.2 Final   • Total Bilirubin 05/04/2022 0.2  0.0 - 1.2 mg/dL Final   • Alkaline Phosphatase 05/04/2022 63  44 - 121 IU/L Final   • AST (SGOT) 05/04/2022 17  0 - 40 IU/L Final   • ALT (SGPT) 05/04/2022 22  0 - 32 IU/L Final   • Thyroglobulin 05/04/2022 <0.2 (A) 1.5 - 38.5 ng/mL Final    Comment: This test was developed and its performance characteristics  determined by [facility]. It has not been cleared or approved  by the Food and Drug Administration.  According to the National Academy of Clinical Biochemistry, the  reference interval for Thyroglobulin (TG) should be related to  euthyroid patients and not for patients who underwent thyroidectomy.  TG reference intervals  for these patients depend on the residual  mass of the thyroid tissue left after surgery. Establishing a  post-operative baseline is recommended.  The assay limit of quantitation is 0.2 ng/mL     • Free T4 05/04/2022 1.65  0.82 - 1.77 ng/dL Final   • TSH 05/04/2022 CANCELED  uIU/mL Final-Edited    Comment: Duplicate procedure ordered.    Result canceled by the ancillary.     • Free T4 05/04/2022 CANCELED  ng/dL Final-Edited    Comment: Duplicate procedure ordered.    Result canceled by the ancillary.     • Thyroid Peroxidase Antibody 05/04/2022 <8  0 - 34 IU/mL Final   • Specimen Status 05/04/2022 Comment   Final    Comment: We have received your request for additional testing, however we are  unable to add the test you requested.  The following test(s) were not  performed:  Test not performed and specimen no longer remains for testing.  Test# [id number] Hemoglobin A1c     • Request Problem 05/04/2022 CANCELED   Final-Edited    Comment: [facility] was unable to collect sufficient specimen to perform the  following test(s), and is providing the patient with re-collection  instructions.        TEST:  [id number]  Thyrotropin Receptor Ab, Serum Panel: [id number]    Result canceled by the ancillary.     • Written Authorization 05/04/2022 Comment   Final    Comment: Written Authorization Received.  Authorization received from WRITTEN REQUEST 05-  Logged by Andressa Eng       Current Outpatient Medications:   •  Cholecalciferol (VITAMIN D3) 1000 UNITS capsule, Take 1 capsule by mouth Daily., Disp: , Rfl:   •  colestipol (COLESTID) 1 g tablet, Take 2 tablets by mouth Daily., Disp: 60 tablet, Rfl: 5  •  Flaxseed, Linseed, (FLAXSEED OIL) 1000 MG capsule, Take 1 capsule by mouth Daily., Disp: , Rfl:   •  fluticasone (FLONASE) 50 MCG/ACT nasal spray, 1 spray into the nostril(s) as directed by provider Daily., Disp: 3 bottle, Rfl: 3  •  lansoprazole (PREVACID) 30 MG capsule, TAKE 1 CAPSULE BY MOUTH EVERY DAY IN THE MORNING, Disp: 90  capsule, Rfl: 3  •  Turmeric (QC TUMERIC COMPLEX PO), Take  by mouth., Disp: , Rfl:   •  Zinc 100 MG tablet, Take  by mouth., Disp: , Rfl:   •  multivitamin with minerals tablet tablet, Take 1 tablet by mouth Daily., Disp: , Rfl:   •  naltrexone-bupropion ER (CONTRAVE) 8-90 MG tablet, Take 2 tablets by mouth 2 (Two) Times a Day., Disp: 120 tablet, Rfl: 0  •  Tirosint 150 MCG capsule, Take 1 capsule by mouth Daily., Disp: 30 capsule, Rfl: 0  Assessment & Plan   Diagnoses and all orders for this visit:    1. Health care maintenance (Primary)    2. Hyperlipidemia, unspecified hyperlipidemia type    3. Thyromegaly  -     US Thyroid; Future    4. History of medullary carcinoma of thyroid  -     US Thyroid; Future    5. Overweight (BMI 25.0-29.9)  -     naltrexone-bupropion ER (CONTRAVE) 8-90 MG tablet; Take 2 tablets by mouth 2 (Two) Times a Day.  Dispense: 120 tablet; Refill: 0    Other orders  -     Discontinue: levothyroxine sodium (Tirosint) 150 MCG capsule; Take 1 capsule by mouth Daily.  Dispense: 30 capsule  -     Tirosint 150 MCG capsule; Take 1 capsule by mouth Daily.  Dispense: 30 capsule; Refill: 0                 1. Preventative counseling- continue to exercise and eat a healthy diet  2. HPL- non fasting, watch carbs and sugars, cut back on red meats and cheese  3. Overweight- discussed wt loss meds, start tracking, continue to be active, F/u in 6 weeks   4. Thyromegaly/Hx of thyroid ca- check US    “Discussed risks/benefits to vaccination, reviewed components of the vaccine, discussed VIS, discussed informed consent, informed consent obtained. Patient/Parent was allowed to accept or refuse vaccine. Questions answered to satisfactory state of patient/Parent. We reviewed typical age appropriate and seasonally appropriate vaccinations. Reviewed immunization history and updated state vaccination form as needed. Patient was counseled on Tdap  COVID-19 and prevnar 20    GYN- UTD

## 2022-06-02 ENCOUNTER — TELEPHONE (OUTPATIENT)
Dept: INTERNAL MEDICINE | Facility: CLINIC | Age: 52
End: 2022-06-02

## 2022-06-02 DIAGNOSIS — E66.3 OVERWEIGHT (BMI 25.0-29.9): ICD-10-CM

## 2022-06-14 RX ORDER — BUPROPION HYDROCHLORIDE 150 MG/1
150 TABLET ORAL DAILY
Qty: 30 TABLET | Refills: 2 | Status: SHIPPED | OUTPATIENT
Start: 2022-06-14 | End: 2022-07-08 | Stop reason: SDUPTHER

## 2022-06-21 ENCOUNTER — HOSPITAL ENCOUNTER (OUTPATIENT)
Dept: ULTRASOUND IMAGING | Facility: HOSPITAL | Age: 52
Discharge: HOME | End: 2022-06-21
Admitting: NURSE PRACTITIONER

## 2022-06-21 DIAGNOSIS — E01.0 THYROMEGALY: ICD-10-CM

## 2022-06-21 DIAGNOSIS — Z85.850 HISTORY OF MEDULLARY CARCINOMA OF THYROID: ICD-10-CM

## 2022-06-21 PROCEDURE — 76536 US EXAM OF HEAD AND NECK: CPT

## 2022-06-22 ENCOUNTER — TELEPHONE (OUTPATIENT)
Dept: INTERNAL MEDICINE | Facility: CLINIC | Age: 52
End: 2022-06-22

## 2022-06-22 NOTE — TELEPHONE ENCOUNTER
Caller: Juliana Rodriguez    Relationship to patient: Self    Best call back number: [phone]       Chief complaint: CHEST PAINS, SQUEEZING, TIGHTNESS    Patient directed to call 911 or go to their nearest emergency room.     Patient verbalized understanding: [x] Yes  [] No  If no, why?    Additional notes:PATIENT STATED SHE WAS WILLING TO GO TO THE EMERGENCY ROOM AND THAT IT WOULD PROBABLY BE [facility]

## 2022-06-30 ENCOUNTER — APPOINTMENT (OUTPATIENT)
Dept: WOMENS IMAGING | Facility: HOSPITAL | Age: 52
End: 2022-06-30

## 2022-06-30 PROCEDURE — 77063 BREAST TOMOSYNTHESIS BI: CPT | Performed by: RADIOLOGY

## 2022-06-30 PROCEDURE — 77067 SCR MAMMO BI INCL CAD: CPT | Performed by: RADIOLOGY

## 2022-07-05 DIAGNOSIS — E01.0 THYROMEGALY: Primary | ICD-10-CM

## 2022-07-07 ENCOUNTER — TELEPHONE (OUTPATIENT)
Dept: INTERNAL MEDICINE | Facility: CLINIC | Age: 52
End: 2022-07-07

## 2022-07-07 NOTE — TELEPHONE ENCOUNTER
PATIENT IS NEEDING AN UPDATE ON THE REFERRAL THAT IS PENDING AT THE MOMENT     SHE HAS NOT HEARD FROM THAT OFFICE YET AND WAS CONCERNED   Juliana Rodriguez (Self) [phone] (M)

## 2022-07-08 RX ORDER — BUPROPION HYDROCHLORIDE 150 MG/1
150 TABLET ORAL DAILY
Qty: 90 TABLET | Refills: 1 | Status: SHIPPED | OUTPATIENT
Start: 2022-07-08 | End: 2022-07-11 | Stop reason: SDUPTHER

## 2022-07-11 ENCOUNTER — OFFICE VISIT (OUTPATIENT)
Dept: INTERNAL MEDICINE | Facility: CLINIC | Age: 52
End: 2022-07-11

## 2022-07-11 VITALS
BODY MASS INDEX: 26.12 KG/M2 | HEIGHT: 64 IN | OXYGEN SATURATION: 96 % | WEIGHT: 153 LBS | SYSTOLIC BLOOD PRESSURE: 118 MMHG | TEMPERATURE: 98.2 F | DIASTOLIC BLOOD PRESSURE: 62 MMHG | HEART RATE: 111 BPM

## 2022-07-11 DIAGNOSIS — L02.91 ABSCESS: ICD-10-CM

## 2022-07-11 DIAGNOSIS — F41.8 MIXED ANXIETY DEPRESSIVE DISORDER: ICD-10-CM

## 2022-07-11 DIAGNOSIS — R59.1 LYMPHADENOPATHY: ICD-10-CM

## 2022-07-11 DIAGNOSIS — K21.9 GASTROESOPHAGEAL REFLUX DISEASE WITHOUT ESOPHAGITIS: Primary | ICD-10-CM

## 2022-07-11 DIAGNOSIS — N64.4 BREAST PAIN, LEFT: ICD-10-CM

## 2022-07-11 PROCEDURE — 99214 OFFICE O/P EST MOD 30 MIN: CPT | Performed by: NURSE PRACTITIONER

## 2022-07-11 RX ORDER — BUPROPION HYDROCHLORIDE 300 MG/1
300 TABLET ORAL DAILY
Qty: 90 TABLET | Refills: 1 | Status: SHIPPED | OUTPATIENT
Start: 2022-07-11 | End: 2022-11-18

## 2022-07-11 RX ORDER — PANTOPRAZOLE SODIUM 40 MG/1
40 TABLET, DELAYED RELEASE ORAL DAILY
Qty: 30 TABLET | Refills: 6 | Status: SHIPPED | OUTPATIENT
Start: 2022-07-11 | End: 2022-09-20

## 2022-07-11 RX ORDER — BUPROPION HYDROCHLORIDE 150 MG/1
150 TABLET ORAL DAILY
Qty: 90 TABLET | Refills: 1 | Status: SHIPPED | OUTPATIENT
Start: 2022-07-11 | End: 2022-07-11 | Stop reason: SDUPTHER

## 2022-07-11 NOTE — PATIENT INSTRUCTIONS
1. GERD- try protonix, will send GI referral, if needed could add on pepcid  2. Abscess- discussed abscess vs lymph node, it is tender, mobile and rubbery, try hot compresses first, will discuss with derm, notify if this persists or worsens.   3. Left breast pain- waiting on mammo results, monitor for now, if this continues or worsens will consider a diagnostic mammo, will treat reflux first   4. Anxiety and depression- ok to try higher dose of wellbutrin, notify if nausea worsens

## 2022-07-11 NOTE — PROGRESS NOTES
Subjective   Juliana Rodriguez is a 51 y.o. female. Fu on HPL     History of Present Illness    The patient is here today with c/o HPL and weight loss. Pt started on contrave 5/26th, initial wt was 159 lbs, she is down 6 lbs.     Was seen at [facility] June 22 with complaints of center left upper chest pain surgical the day prior.  She did note some nausea.  Chest x-ray negative acute.  CT angio negative acute.  EKG is within normal limits.  Troponins negative.  D-dimer was elevated.  She did not tolerated the contrave.      Precancerous lesion on her nose, treated through derm with cream.     Still with some nausea, left sided and slight cough. Also with sore spot left breast.     Feeling more anxious, mainly due to the abnormal lymph node.     Mammo UTD.   The following portions of the patient's history were reviewed and updated as appropriate: allergies, current medications, past family history, past medical history, past social history, past surgical history and problem list.    Review of Systems    Objective   Physical Exam  Constitutional:       Appearance: Normal appearance. She is well-developed.   Neck:      Thyroid: No thyromegaly.   Cardiovascular:      Rate and Rhythm: Normal rate and regular rhythm.      Heart sounds: Normal heart sounds.   Pulmonary:      Effort: Pulmonary effort is normal. No retractions.      Breath sounds: Normal breath sounds.   Chest:      Chest wall: No mass, lacerations, deformity, swelling, tenderness, crepitus or edema.   Breasts: Breasts are symmetrical.      Right: Normal. No inverted nipple, mass, nipple discharge, skin change or tenderness.      Left: Tenderness (left breast at 8:00) present. No inverted nipple, mass, nipple discharge or skin change.         Abdominal:      General: Abdomen is flat. Bowel sounds are normal.      Palpations: Abdomen is soft.      Tenderness: There is no abdominal tenderness.   Musculoskeletal:      Cervical back: Normal range of motion and neck  supple.   Lymphadenopathy:      Cervical: No cervical adenopathy.   Skin:     General: Skin is warm and dry.      Findings: Lesion present.             Comments: Left groin near labia with approx 1 cm mobile, tender, slightly erythematous nodule    Neurological:      Mental Status: She is alert.   Psychiatric:         Behavior: Behavior normal.         Thought Content: Thought content normal.         Judgment: Judgment normal.         Vitals:    07/11/22 1113   BP: 118/62   Pulse: 111   Temp: 98.2 °F (36.8 °C)   SpO2: 96%     Body mass index is 26.25 kg/m².      Current Outpatient Medications:   •  buPROPion XL (Wellbutrin XL) 300 MG 24 hr tablet, Take 1 tablet by mouth Daily for 90 days., Disp: 90 tablet, Rfl: 1  •  Cholecalciferol (VITAMIN D3) 1000 UNITS capsule, Take 1 capsule by mouth Daily., Disp: , Rfl:   •  colestipol (COLESTID) 1 g tablet, Take 2 tablets by mouth Daily., Disp: 60 tablet, Rfl: 5  •  fluticasone (FLONASE) 50 MCG/ACT nasal spray, 1 spray into the nostril(s) as directed by provider Daily., Disp: 3 bottle, Rfl: 3  •  multivitamin with minerals tablet tablet, Take 1 tablet by mouth Daily., Disp: , Rfl:   •  Tirosint 150 MCG capsule, Take 1 capsule by mouth Daily., Disp: 30 capsule, Rfl: 0  •  Turmeric (QC TUMERIC COMPLEX PO), Take  by mouth., Disp: , Rfl:   •  Zinc 100 MG tablet, Take  by mouth., Disp: , Rfl:   •  pantoprazole (Protonix) 40 MG EC tablet, Take 1 tablet by mouth Daily., Disp: 30 tablet, Rfl: 6  Assessment & Plan   Diagnoses and all orders for this visit:    1. Gastroesophageal reflux disease without esophagitis (Primary)  -     Ambulatory Referral to Gastroenterology    2. Abscess    3. Breast pain, left    4. Mixed anxiety depressive disorder    5. Lymphadenopathy    Other orders  -     Discontinue: buPROPion XL (Wellbutrin XL) 150 MG 24 hr tablet; Take 1 tablet by mouth Daily for 90 days.  Dispense: 90 tablet; Refill: 1  -     buPROPion XL (Wellbutrin XL) 300 MG 24 hr tablet; Take  1 tablet by mouth Daily for 90 days.  Dispense: 90 tablet; Refill: 1  -     pantoprazole (Protonix) 40 MG EC tablet; Take 1 tablet by mouth Daily.  Dispense: 30 tablet; Refill: 6                 1. GERD- try protonix, will send GI referral, if needed could add on pepcid  2. Abscess- discussed abscess vs lymph node, it is tender, mobile and rubbery, try hot compresses first, will discuss with derm, notify if this persists or worsens.   3. Left breast pain- waiting on mammo results, monitor for now, if this continues or worsens will consider a diagnostic mammo, will treat reflux first   4. Anxiety and depression- ok to try higher dose of wellbutrin, notify if nausea worsens  5. Abnormal lymph node- waiting to hear back for ENT scheduling    Answers for HPI/ROS submitted by the patient on 7/4/2022  Please describe your symptoms.: No symptoms. Followup  Have you had these symptoms before?: No  How long have you been having these symptoms?: Greater than 2 weeks  What is the primary reason for your visit?: Other

## 2022-07-28 RX ORDER — MONTELUKAST SODIUM 4 MG/1
2 TABLET, CHEWABLE ORAL DAILY
Qty: 180 TABLET | Refills: 1 | Status: SHIPPED | OUTPATIENT
Start: 2022-07-28 | End: 2023-02-02

## 2022-09-20 ENCOUNTER — TELEMEDICINE (OUTPATIENT)
Dept: INTERNAL MEDICINE | Facility: CLINIC | Age: 52
End: 2022-09-20

## 2022-09-20 VITALS — OXYGEN SATURATION: 97 % | HEART RATE: 100 BPM

## 2022-09-20 DIAGNOSIS — U07.1 COVID-19: Primary | ICD-10-CM

## 2022-09-20 PROCEDURE — 99213 OFFICE O/P EST LOW 20 MIN: CPT | Performed by: NURSE PRACTITIONER

## 2022-09-20 RX ORDER — OMEPRAZOLE 40 MG/1
40 CAPSULE, DELAYED RELEASE ORAL DAILY
COMMUNITY
End: 2022-10-11 | Stop reason: SDUPTHER

## 2022-09-20 NOTE — PROGRESS NOTES
"Subjective   Juliana Rodriguez is a 51 y.o. female.     History of Present Illness    The patient is here today with c/o COVID-19. Tested positive 9/17th.  Her symptoms started 9/17th. She has myalgias, sore throat (now resolved), chills, fever, fatigue, upset stomach.   She is taking tylenol and ibuprofen with little relief. Has an inhaler at home and has not tried it yet.     She is taking a balance of nature vitamin.     Has never had the covid vaccine or an infection.     Lymph node- to have another US in [facility] with ENT  The following portions of the patient's history were reviewed and updated as appropriate: allergies, current medications, past family history, past medical history, past social history, past surgical history and problem list.    Review of Systems   Constitutional: Positive for chills, fatigue and fever.   HENT: Positive for postnasal drip and rhinorrhea. Negative for ear pain, sinus pressure and sore throat.    Respiratory: Positive for cough (productive). Negative for shortness of breath and wheezing.    Cardiovascular: Positive for chest pain (\"a little bit\", all the time). Negative for palpitations and leg swelling.   Gastrointestinal: Positive for diarrhea (some today) and nausea. Negative for vomiting.   Neurological: Negative for headache.       Objective   Physical Exam  Constitutional:       Appearance: Normal appearance. She is ill-appearing.   Pulmonary:      Effort: Pulmonary effort is normal.   Neurological:      General: No focal deficit present.      Mental Status: She is alert.   Psychiatric:         Mood and Affect: Mood normal.         Behavior: Behavior normal.         Thought Content: Thought content normal.         Judgment: Judgment normal.         Vitals:    09/20/22 1112   Pulse: 100   SpO2: 97%     There is no height or weight on file to calculate BMI.      Assessment & Plan   Diagnoses and all orders for this visit:    1. COVID-19 (Primary)          Visit via video, pt " consents, via doximity. Visit lasted 20 minutes. She was at home and I was at [address] [facility] during visit.        1. COVID-19- ok to take Vit D,C and zinc, use your albuterol inhaler, hydrate well, NSS mucinex  Quarantine discussed   Discussed treatment options, she is not a high risk patient, pt is aware of antibody infusion.   Suggest to ER with chest pain

## 2022-09-26 ENCOUNTER — LAB (OUTPATIENT)
Dept: INTERNAL MEDICINE | Facility: CLINIC | Age: 52
End: 2022-09-26
Payer: COMMERCIAL

## 2022-10-07 ENCOUNTER — TRANSCRIBE ORDERS (OUTPATIENT)
Dept: ADMINISTRATIVE | Facility: HOSPITAL | Age: 52
End: 2022-10-07

## 2022-10-07 DIAGNOSIS — R59.0 CERVICAL LYMPHADENOPATHY: Primary | ICD-10-CM

## 2022-10-11 ENCOUNTER — TELEPHONE (OUTPATIENT)
Dept: INTERNAL MEDICINE | Facility: CLINIC | Age: 52
End: 2022-10-11

## 2022-10-11 RX ORDER — OMEPRAZOLE 40 MG/1
40 CAPSULE, DELAYED RELEASE ORAL DAILY
Qty: 90 CAPSULE | Refills: 1 | Status: SHIPPED | OUTPATIENT
Start: 2022-10-11

## 2022-10-11 NOTE — TELEPHONE ENCOUNTER
RX SENT TO PHARMACY      ----- Message from JOSSY Ybarra sent at 10/11/2022  1:05 PM EDT -----  Regarding: FW: Omeprazole  Contact: [phone]  Ok for omeprazole 40 mg PO daily #90, 1 refill   ----- Message -----  From: Destiney Best MA  Sent: 10/11/2022   8:02 AM EDT  To: JOSSY Ybarra  Subject: FW: Omeprazole                                   Is this ok?   ----- Message -----  From: Juliana Rodriguez  Sent: 10/10/2022   2:34 PM EDT  To: Jasbir Newell [place] [id number] Clinical Pool  Subject: Omeprazole                                       Yes pls but Omeprazole not lansosoprozale

## 2022-11-04 ENCOUNTER — HOSPITAL ENCOUNTER (OUTPATIENT)
Dept: ULTRASOUND IMAGING | Facility: HOSPITAL | Age: 52
Discharge: HOME | End: 2022-11-04
Admitting: OTOLARYNGOLOGY

## 2022-11-04 DIAGNOSIS — R59.0 CERVICAL LYMPHADENOPATHY: ICD-10-CM

## 2022-11-04 PROCEDURE — 76536 US EXAM OF HEAD AND NECK: CPT

## 2022-11-18 RX ORDER — BUPROPION HYDROCHLORIDE 300 MG/1
TABLET ORAL
Qty: 90 TABLET | Refills: 1 | Status: SHIPPED | OUTPATIENT
Start: 2022-11-18

## 2023-01-10 NOTE — TELEPHONE ENCOUNTER
----- Message from JOSSY Ybarra sent at 7/30/2018 11:11 AM EDT -----  No, just powder  ----- Message -----  From: Riley Gaston  Sent: 7/30/2018   9:54 AM  To: JOSSY Ybarra    Patient wants to know if cholestyramine comes in a pill form or if there is a pill alternative?  ----- Message -----  From: Yessy Hines  Sent: 7/30/2018   9:42 AM  To: Riley Gaston    Patients cholesterol medication is a powder that you mix in water and she wants to know if it could be switched to a pill form. She would like to be called back about this    Juliana [phone]       Cantharidin Pregnancy And Lactation Text: The use of this medication during pregnancy or lactation is not recommended as there is insufficient data.

## 2023-02-02 RX ORDER — MONTELUKAST SODIUM 4 MG/1
2 TABLET, CHEWABLE ORAL DAILY
Qty: 180 TABLET | Refills: 1 | Status: SHIPPED | OUTPATIENT
Start: 2023-02-02

## 2023-03-09 RX ORDER — BUPROPION HYDROCHLORIDE 150 MG/1
TABLET ORAL
Qty: 90 TABLET | Refills: 1 | Status: SHIPPED | OUTPATIENT
Start: 2023-03-09

## 2023-03-30 LAB
25(OH)D3+25(OH)D2 SERPL-MCNC: 27.8 NG/ML (ref 30–100)
BASOPHILS # BLD AUTO: 0.1 X10E3/UL (ref 0–0.2)
BASOPHILS NFR BLD AUTO: 1 %
BUN SERPL-MCNC: 13 MG/DL (ref 6–24)
BUN/CREAT SERPL: 15 (ref 9–23)
CALCIUM SERPL-MCNC: 9.3 MG/DL (ref 8.7–10.2)
CHLORIDE SERPL-SCNC: 101 MMOL/L (ref 96–106)
CHOLEST SERPL-MCNC: 241 MG/DL (ref 100–199)
CO2 SERPL-SCNC: 20 MMOL/L (ref 20–29)
CREAT SERPL-MCNC: 0.89 MG/DL (ref 0.57–1)
EGFRCR SERPLBLD CKD-EPI 2021: 77 ML/MIN/1.73
EOSINOPHIL # BLD AUTO: 0.3 X10E3/UL (ref 0–0.4)
EOSINOPHIL NFR BLD AUTO: 3 %
ERYTHROCYTE [DISTWIDTH] IN BLOOD BY AUTOMATED COUNT: 14.7 % (ref 11.7–15.4)
GLUCOSE SERPL-MCNC: 91 MG/DL (ref 70–99)
HCT VFR BLD AUTO: 46 % (ref 34–46.6)
HDLC SERPL-MCNC: 55 MG/DL
HGB BLD-MCNC: 15 G/DL (ref 11.1–15.9)
IMM GRANULOCYTES # BLD AUTO: 0 X10E3/UL (ref 0–0.1)
IMM GRANULOCYTES NFR BLD AUTO: 0 %
LDLC SERPL CALC-MCNC: 128 MG/DL (ref 0–99)
LDLC/HDLC SERPL: 2.3 RATIO (ref 0–3.2)
LYMPHOCYTES # BLD AUTO: 3.3 X10E3/UL (ref 0.7–3.1)
LYMPHOCYTES NFR BLD AUTO: 34 %
MCH RBC QN AUTO: 27.3 PG (ref 26.6–33)
MCHC RBC AUTO-ENTMCNC: 32.6 G/DL (ref 31.5–35.7)
MCV RBC AUTO: 84 FL (ref 79–97)
MONOCYTES # BLD AUTO: 0.7 X10E3/UL (ref 0.1–0.9)
MONOCYTES NFR BLD AUTO: 7 %
NEUTROPHILS # BLD AUTO: 5.3 X10E3/UL (ref 1.4–7)
NEUTROPHILS NFR BLD AUTO: 55 %
PLATELET # BLD AUTO: 276 X10E3/UL (ref 150–450)
POTASSIUM SERPL-SCNC: 4.5 MMOL/L (ref 3.5–5.2)
RBC # BLD AUTO: 5.49 X10E6/UL (ref 3.77–5.28)
SODIUM SERPL-SCNC: 140 MMOL/L (ref 134–144)
TRIGL SERPL-MCNC: 327 MG/DL (ref 0–149)
TSH SERPL DL<=0.005 MIU/L-ACNC: 0.26 UIU/ML (ref 0.45–4.5)
VLDLC SERPL CALC-MCNC: 58 MG/DL (ref 5–40)
WBC # BLD AUTO: 9.7 X10E3/UL (ref 3.4–10.8)

## 2023-04-20 ENCOUNTER — TELEPHONE (OUTPATIENT)
Dept: INTERNAL MEDICINE | Facility: CLINIC | Age: 53
End: 2023-04-20
Payer: COMMERCIAL

## 2023-04-20 NOTE — TELEPHONE ENCOUNTER
Caller: Michael Juliana    Relationship: Self    Best call back number: [phone]    What was the call regarding: PATIENT COMPLETED BLOOD WORK FOR HER THYROID DOCTOR ON 03/29/2023 AND THEY RAN THE LABS WITH HARINDER RICHARD'S ORDERS. PATIENT WANTED TO KNOW IF SHE WOULD STILL NEED TO DRAW LABS AGAIN OR IF THOSE COULD BE USED. PLEASE ADVISE.    Do you require a callback: YES

## 2023-04-21 NOTE — TELEPHONE ENCOUNTER
Called and informed patient that the labs done with her endocrinologist will work and she does not need labs redrawn - per Annie Cameron.   Attending Attestation (For Attendings USE Only)...

## 2023-10-12 DIAGNOSIS — E78.5 HYPERLIPIDEMIA, UNSPECIFIED HYPERLIPIDEMIA TYPE: ICD-10-CM

## 2023-10-12 DIAGNOSIS — Z00.00 HEALTH CARE MAINTENANCE: Primary | ICD-10-CM

## 2023-10-12 DIAGNOSIS — E55.9 VITAMIN D DEFICIENCY: ICD-10-CM

## 2023-10-14 LAB
25(OH)D3+25(OH)D2 SERPL-MCNC: 53.5 NG/ML (ref 30–100)
ALBUMIN SERPL-MCNC: 4.9 G/DL (ref 3.5–5.2)
ALBUMIN/GLOB SERPL: 2.3 G/DL
ALP SERPL-CCNC: 71 U/L (ref 39–117)
ALT SERPL-CCNC: 24 U/L (ref 1–33)
AST SERPL-CCNC: 17 U/L (ref 1–32)
BASOPHILS # BLD AUTO: 0.12 10*3/MM3 (ref 0–0.2)
BASOPHILS NFR BLD AUTO: 1.2 % (ref 0–1.5)
BILIRUB SERPL-MCNC: 0.3 MG/DL (ref 0–1.2)
BUN SERPL-MCNC: 9 MG/DL (ref 6–20)
BUN/CREAT SERPL: 8.9 (ref 7–25)
CALCIUM SERPL-MCNC: 9.3 MG/DL (ref 8.6–10.5)
CHLORIDE SERPL-SCNC: 104 MMOL/L (ref 98–107)
CHOLEST SERPL-MCNC: 249 MG/DL (ref 0–200)
CHOLEST/HDLC SERPL: 4.29 {RATIO}
CO2 SERPL-SCNC: 21.5 MMOL/L (ref 22–29)
CREAT SERPL-MCNC: 1.01 MG/DL (ref 0.57–1)
EGFRCR SERPLBLD CKD-EPI 2021: 66.3 ML/MIN/1.73
EOSINOPHIL # BLD AUTO: 0.87 10*3/MM3 (ref 0–0.4)
EOSINOPHIL NFR BLD AUTO: 8.5 % (ref 0.3–6.2)
ERYTHROCYTE [DISTWIDTH] IN BLOOD BY AUTOMATED COUNT: 14.8 % (ref 12.3–15.4)
GLOBULIN SER CALC-MCNC: 2.1 GM/DL
GLUCOSE SERPL-MCNC: 98 MG/DL (ref 65–99)
HCT VFR BLD AUTO: 44.7 % (ref 34–46.6)
HDLC SERPL-MCNC: 58 MG/DL (ref 40–60)
HGB BLD-MCNC: 15.1 G/DL (ref 12–15.9)
IMM GRANULOCYTES # BLD AUTO: 0.04 10*3/MM3 (ref 0–0.05)
IMM GRANULOCYTES NFR BLD AUTO: 0.4 % (ref 0–0.5)
LDLC SERPL CALC-MCNC: 174 MG/DL (ref 0–100)
LYMPHOCYTES # BLD AUTO: 3.19 10*3/MM3 (ref 0.7–3.1)
LYMPHOCYTES NFR BLD AUTO: 31.1 % (ref 19.6–45.3)
MCH RBC QN AUTO: 29.7 PG (ref 26.6–33)
MCHC RBC AUTO-ENTMCNC: 33.8 G/DL (ref 31.5–35.7)
MCV RBC AUTO: 87.8 FL (ref 79–97)
MONOCYTES # BLD AUTO: 0.52 10*3/MM3 (ref 0.1–0.9)
MONOCYTES NFR BLD AUTO: 5.1 % (ref 5–12)
NEUTROPHILS # BLD AUTO: 5.53 10*3/MM3 (ref 1.7–7)
NEUTROPHILS NFR BLD AUTO: 53.7 % (ref 42.7–76)
NRBC BLD AUTO-RTO: 0 /100 WBC (ref 0–0.2)
PLATELET # BLD AUTO: 265 10*3/MM3 (ref 140–450)
POTASSIUM SERPL-SCNC: 4.7 MMOL/L (ref 3.5–5.2)
PROT SERPL-MCNC: 7 G/DL (ref 6–8.5)
RBC # BLD AUTO: 5.09 10*6/MM3 (ref 3.77–5.28)
SODIUM SERPL-SCNC: 140 MMOL/L (ref 136–145)
TRIGL SERPL-MCNC: 98 MG/DL (ref 0–150)
TSH SERPL DL<=0.005 MIU/L-ACNC: 4.11 UIU/ML (ref 0.27–4.2)
VLDLC SERPL CALC-MCNC: 17 MG/DL (ref 5–40)
WBC # BLD AUTO: 10.27 10*3/MM3 (ref 3.4–10.8)

## 2023-10-19 ENCOUNTER — OFFICE VISIT (OUTPATIENT)
Dept: INTERNAL MEDICINE | Facility: CLINIC | Age: 53
End: 2023-10-19
Payer: COMMERCIAL

## 2023-10-19 VITALS
BODY MASS INDEX: 25.44 KG/M2 | DIASTOLIC BLOOD PRESSURE: 82 MMHG | HEART RATE: 111 BPM | WEIGHT: 149 LBS | SYSTOLIC BLOOD PRESSURE: 128 MMHG | OXYGEN SATURATION: 98 % | HEIGHT: 64 IN

## 2023-10-19 DIAGNOSIS — Z85.850 HISTORY OF MEDULLARY CARCINOMA OF THYROID: ICD-10-CM

## 2023-10-19 DIAGNOSIS — R30.0 DYSURIA: ICD-10-CM

## 2023-10-19 DIAGNOSIS — Z00.00 HEALTH CARE MAINTENANCE: Primary | ICD-10-CM

## 2023-10-19 DIAGNOSIS — E78.5 HYPERLIPIDEMIA, UNSPECIFIED HYPERLIPIDEMIA TYPE: ICD-10-CM

## 2023-10-19 DIAGNOSIS — R35.0 URINARY FREQUENCY: ICD-10-CM

## 2023-10-19 PROBLEM — H51.9 CONVERGENCE INSUFFICIENCY OR PALSY IN BINOCULAR EYE MOVEMENT: Status: ACTIVE | Noted: 2023-10-19

## 2023-10-19 LAB
BILIRUB BLD-MCNC: NEGATIVE MG/DL
CLARITY, POC: CLEAR
COLOR UR: YELLOW
EXPIRATION DATE: ABNORMAL
GLUCOSE UR STRIP-MCNC: NEGATIVE MG/DL
KETONES UR QL: NEGATIVE
LEUKOCYTE EST, POC: ABNORMAL
Lab: ABNORMAL
NITRITE UR-MCNC: NEGATIVE MG/ML
PH UR: 5 [PH] (ref 5–8)
PROT UR STRIP-MCNC: NEGATIVE MG/DL
RBC # UR STRIP: ABNORMAL /UL
SP GR UR: 1 (ref 1–1.03)
UROBILINOGEN UR QL: ABNORMAL

## 2023-10-19 PROCEDURE — 81003 URINALYSIS AUTO W/O SCOPE: CPT | Performed by: NURSE PRACTITIONER

## 2023-10-19 PROCEDURE — 99396 PREV VISIT EST AGE 40-64: CPT | Performed by: NURSE PRACTITIONER

## 2023-10-19 RX ORDER — NITROFURANTOIN 25; 75 MG/1; MG/1
100 CAPSULE ORAL 2 TIMES DAILY
Qty: 10 CAPSULE | Refills: 0 | Status: SHIPPED | OUTPATIENT
Start: 2023-10-19

## 2023-10-19 RX ORDER — PROGESTERONE 100 MG/1
CAPSULE ORAL
COMMUNITY
Start: 2023-10-14

## 2023-10-19 RX ORDER — LEVOTHYROXINE SODIUM 100 UG/1
100 CAPSULE ORAL DAILY
Start: 2023-10-19

## 2023-10-19 RX ORDER — VALACYCLOVIR HYDROCHLORIDE 500 MG/1
1 TABLET, FILM COATED ORAL DAILY
COMMUNITY
Start: 2023-09-07

## 2023-10-19 NOTE — PROGRESS NOTES
Subjective   Juliana Rodriguez is a 52 y.o. female.     History of Present Illness   The patient is here today for CPE and lab work F/U. She is feeling well other than feeling some urinary frequency for the past 3 days. Has not taken anything OTC.     She is eating healthy and exercising. Is eating gluten free, seeing a functional med doctor. She is receiving semaglutide, taking since July. Taking it for brain health.     She is on valtrex for poss silent shingles X about 6 months. Some improvement initially with temporal headaches now they are returning.   She has previously seen neuro and had an MRI.   5/24 started with ocular pain in May, saw a specialist and had an MRI all neg. 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     Hypothyroidism-Pt is doing well with current medication regimen, denies adverse reactions, compliant with medication schedule.   Enlarged lymph nodes- being watched annually by Dr. Ricardo Fonseca    BMI is >= 25 and <30. (Overweight) The following options were offered after discussion;: exercise counseling/recommendations and nutrition counseling/recommendations     The 10-year ASCVD risk score (Gisel DK, et al., 2019) is: 2.2%    Values used to calculate the score:      Age: 52 years      Sex: Female      Is Non- : No      Diabetic: No      Tobacco smoker: No      Systolic Blood Pressure: 128 mmHg      Is BP treated: No      HDL Cholesterol: 58 mg/dL      Total Cholesterol: 249 mg/dL      Dad passed Feb 2023.  The following portions of the patient's history were reviewed and updated as appropriate: allergies, current medications, past family history, past medical history, past social history, past surgical history and problem list.    Review of Systems   Constitutional:  Positive for fatigue. Negative for chills and fever.   HENT:  Negative for ear pain, rhinorrhea and sore throat.    Eyes: Negative.    Respiratory:   Negative for cough and shortness of breath.    Cardiovascular: Negative.    Gastrointestinal:  Positive for diarrhea (functional med just sent in-).   Endocrine: Negative for cold intolerance and heat intolerance.   Genitourinary:  Positive for frequency and urgency. Negative for breast discharge, breast lump, breast pain, difficulty urinating, dyspareunia, dysuria, flank pain, genital sores, hematuria, pelvic pain and pelvic pressure.   Musculoskeletal: Negative.    Skin: Negative.    Allergic/Immunologic: Positive for food allergies. Negative for environmental allergies.   Neurological: Negative.    Hematological:  Positive for adenopathy (comes and go submental). Does not bruise/bleed easily.   Psychiatric/Behavioral:  Negative for dysphoric mood and suicidal ideas. The patient is not nervous/anxious.        Objective   Physical Exam  Constitutional:       Appearance: Normal appearance. She is well-developed.      Comments: Body mass index is 25.56 kg/m².     HENT:      Right Ear: Hearing, tympanic membrane, ear canal and external ear normal.      Left Ear: Hearing, tympanic membrane, ear canal and external ear normal.   Eyes:      General: Lids are normal.      Conjunctiva/sclera: Conjunctivae normal.      Pupils: Pupils are equal, round, and reactive to light.   Neck:      Thyroid: No thyroid mass or thyromegaly.      Vascular: No carotid bruit.      Trachea: Trachea normal.   Cardiovascular:      Rate and Rhythm: Normal rate and regular rhythm.      Pulses: Normal pulses.      Heart sounds: Normal heart sounds.   Pulmonary:      Effort: Pulmonary effort is normal.      Breath sounds: Normal breath sounds.   Abdominal:      General: Bowel sounds are normal.      Palpations: Abdomen is soft.      Tenderness: There is no abdominal tenderness.   Musculoskeletal:         General: Normal range of motion.      Cervical back: Normal range of motion.   Lymphadenopathy:      Cervical: No cervical adenopathy.      Upper  Body:      Right upper body: No supraclavicular adenopathy.      Left upper body: No supraclavicular adenopathy.      Lower Body: No right inguinal adenopathy. No left inguinal adenopathy.   Skin:     General: Skin is warm and dry.   Neurological:      Mental Status: She is alert and oriented to person, place, and time.      GCS: GCS eye subscore is 4. GCS verbal subscore is 5. GCS motor subscore is 6.      Cranial Nerves: No cranial nerve deficit.      Sensory: No sensory deficit.      Deep Tendon Reflexes:      Reflex Scores:       Patellar reflexes are 2+ on the right side and 2+ on the left side.  Psychiatric:         Speech: Speech normal.         Behavior: Behavior normal. Behavior is cooperative.         Thought Content: Thought content normal.         Judgment: Judgment normal.         Vitals:    10/19/23 1448   BP: 128/82   Pulse:    SpO2:      Body mass index is 25.56 kg/m².      Assessment & Plan   Diagnoses and all orders for this visit:    1. Health care maintenance (Primary)    2. Dysuria  -     POCT urinalysis dipstick, automated  -     Urine Culture - Urine, Urine, Clean Catch    3. History of medullary carcinoma of thyroid  -     TSH; Future  -     T4, free; Future  -     T3, free; Future    4. Hyperlipidemia, unspecified hyperlipidemia type    5. Urinary frequency  -     nitrofurantoin, macrocrystal-monohydrate, (Macrobid) 100 MG capsule; Take 1 capsule by mouth 2 (Two) Times a Day.  Dispense: 10 capsule; Refill: 0    Other orders  -     Tirosint 100 MCG capsule; Take 1 capsule by mouth Daily.                 1. Preventative counseling- continue to work on healthy diet an exercise   2. Increased creatinine- hydrate well and use NSAIDs sparingly   3. HPL- mediterranean style diet and exercise, if LDL remains this elevated will discuss statin at follow up  4. Hypothyroidism/hx of thyroid ca- TSH is too high, seeing endo, currently taking 88 mcg and has been decreased. New Rx at pharmacy for 100 mcg  daily   5. Urinary urgency- check cx, will send in rx in case of worsening symptoms over the weekend , increase hydration     Discussed samglutide safety, SEs, benefits and risks.   Defers vaccines

## 2023-10-19 NOTE — PATIENT INSTRUCTIONS
1. Preventative counseling- continue to work on healthy diet an exercise   2. Increased creatinine- hydrate well and use NSAIDs sparingly   3. HPL- mediterranean style diet and exercise, if LDL remains this elevated will discuss statin at follow up  4. Hypothyroidism/hx of thyroid ca- TSH is too high, seeing evelio, currently taking 88 mcg and has been decreased. New Rx at pharmacy for 100 mcg daily   5. Urinary urgency- check cx, will send in rx in case of worsening symptoms over the weekend , increase hydration

## 2023-10-24 ENCOUNTER — TELEPHONE (OUTPATIENT)
Dept: INTERNAL MEDICINE | Facility: CLINIC | Age: 53
End: 2023-10-24
Payer: COMMERCIAL

## 2023-10-24 DIAGNOSIS — R35.0 URINARY FREQUENCY: ICD-10-CM

## 2023-10-24 LAB
BACTERIA UR CULT: ABNORMAL
BACTERIA UR CULT: ABNORMAL
OTHER ANTIBIOTIC SUSC ISLT: ABNORMAL

## 2023-10-24 RX ORDER — NITROFURANTOIN 25; 75 MG/1; MG/1
100 CAPSULE ORAL 2 TIMES DAILY
Qty: 10 CAPSULE | Refills: 0 | Status: SHIPPED | OUTPATIENT
Start: 2023-10-24

## 2023-10-24 RX ORDER — LEVOTHYROXINE SODIUM 100 UG/1
100 CAPSULE ORAL DAILY
Start: 2023-10-24

## 2023-10-24 NOTE — TELEPHONE ENCOUNTER
Caller: Juliana Rodriguez    Relationship: Self    Best call back number: [phone]     Which medication are you concerned about: nitrofurantoin, macrocrystal-monohydrate, (Macrobid) 100 MG capsule     Tirosint 100 MCG capsule    Who prescribed you this medication: JOSSY RICHARD    When did you start taking this medication: N/A    What are your concerns: PHARMACY HAS NOT FILLED THIS MEDICATION FOR THAT PATIENT - UNABLE TO CONNECT WITH THEN VIA PHONE, PHARMACY IS CLOSED BY THE TIME SHE IS ABLE TO STOP AND TRY TO PICK IT UP.    How long have you had these concerns: 10/19/23

## 2023-12-15 NOTE — TELEPHONE ENCOUNTER
Caller: Juliana Rodriguez    Relationship: Self    Best call back number: [phone]    Requested Prescriptions:   Requested Prescriptions     Pending Prescriptions Disp Refills    Tirosint 100 MCG capsule       Sig: Take 1 capsule by mouth Daily.        Pharmacy where request should be sent: [facility] [id number] [place] [facility] [address] [facility] [address] - [phone] PH - [phone] FX     Last office visit with prescribing clinician: 10/19/2023   Last telemedicine visit with prescribing clinician: Visit date not found   Next office visit with prescribing clinician: 4/18/2024     Additional details provided by patient: PATIENT STATED THAT HARINDER RICHARD HAD INFORMED HER THAT SHE CAN TAKE OVER HER THYROID CARE. STATED THAT SHE WOULD LIKE FOR HARINDER TO DO SO. STATED THAT SHE HAS BEEN TRYING TO GET THE MEDICATION CALLED IN FROM THE NORMAL PROVIDER BUT THAT THEY HAVE NOT HAD ANY LUCK. STATED THAT SHE HAS ABOUT 5 DAYS OF THE MEDICATION REMAINING AND THAT THEY NEED IT SENT IN AS A 90 DAY SUPPLY. STATED THAT THEY WOULD LIKE TO HAVE THIS CALLED IN TODAY     Does the patient have less than a 3 day supply:  [] Yes  [x] No    Would you like a call back once the refill request has been completed: [] Yes [x] No    If the office needs to give you a call back, can they leave a voicemail: [] Yes [x] No    Keila Rosales Rep   12/15/23 11:18 EST

## 2023-12-15 NOTE — TELEPHONE ENCOUNTER
Patient sees endo for hypothyroidism but would like you to take over her Tirosint rx instead. Okay to fill?

## 2023-12-19 RX ORDER — LEVOTHYROXINE SODIUM 100 UG/1
100 CAPSULE ORAL DAILY
Qty: 90 CAPSULE | Refills: 1
Start: 2023-12-19

## 2024-01-17 ENCOUNTER — OFFICE VISIT (OUTPATIENT)
Dept: INTERNAL MEDICINE | Facility: CLINIC | Age: 54
End: 2024-01-17
Payer: COMMERCIAL

## 2024-01-17 VITALS
DIASTOLIC BLOOD PRESSURE: 70 MMHG | BODY MASS INDEX: 25.27 KG/M2 | SYSTOLIC BLOOD PRESSURE: 112 MMHG | OXYGEN SATURATION: 98 % | HEIGHT: 64 IN | HEART RATE: 112 BPM | WEIGHT: 148 LBS

## 2024-01-17 DIAGNOSIS — N64.4 BREAST PAIN, LEFT: Primary | ICD-10-CM

## 2024-01-17 DIAGNOSIS — R07.89 LEFT-SIDED CHEST WALL PAIN: ICD-10-CM

## 2024-01-17 PROCEDURE — 99214 OFFICE O/P EST MOD 30 MIN: CPT | Performed by: NURSE PRACTITIONER

## 2024-01-17 PROCEDURE — 93000 ELECTROCARDIOGRAM COMPLETE: CPT | Performed by: NURSE PRACTITIONER

## 2024-01-17 RX ORDER — CYCLOBENZAPRINE HCL 5 MG
5 TABLET ORAL NIGHTLY PRN
Qty: 30 TABLET | Refills: 0 | Status: SHIPPED | OUTPATIENT
Start: 2024-01-17

## 2024-01-17 NOTE — PROGRESS NOTES
Subjective   Juliana Rodriguez is a 53 y.o. female.     History of Present Illness    The patient is here today with c/o left breast pain for about 10-14 days. Took tylenol with out relief. It is intermittent and will come and go. Some worsening. Starts at the left lateral border and radiates in towards the mediastinum. The pain can wrap around the back of her shoulder.   No worsening pain with activity.   The pain does not worsen with reaching or pulling.   Ice helps the pain.   Last mammo was around August, nl per patient.    Taking progesterone and testosterone through naturopath. No dosage changes since June.     She takes magnesium glycinate, D3 with K2, and occ B vitamins.     The following portions of the patient's history were reviewed and updated as appropriate: allergies, current medications, past family history, past medical history, past social history, past surgical history and problem list.    Review of Systems   Constitutional: Negative.    Respiratory: Negative.     Cardiovascular: Negative.    Genitourinary:  Positive for breast pain. Negative for breast discharge and breast lump.   Musculoskeletal:  Positive for back pain. Negative for arthralgias.       Objective   Physical Exam  Constitutional:       Appearance: Normal appearance. She is well-developed.   Neck:      Thyroid: No thyromegaly.   Cardiovascular:      Rate and Rhythm: Normal rate and regular rhythm.      Heart sounds: Normal heart sounds.   Pulmonary:      Effort: Pulmonary effort is normal.      Breath sounds: Normal breath sounds.   Chest:   Breasts:     Breasts are symmetrical.      Right: Normal. No swelling, bleeding, inverted nipple, mass, nipple discharge, skin change or tenderness.      Left: Normal. Tenderness present. No swelling, bleeding, inverted nipple, mass, nipple discharge or skin change.          Comments: Pain at 3:30  Musculoskeletal:      Left shoulder: Normal.      Cervical back: Normal range of motion and neck  supple.   Lymphadenopathy:      Cervical: No cervical adenopathy.      Upper Body:      Right upper body: No supraclavicular, axillary or pectoral adenopathy.      Left upper body: No supraclavicular, axillary or pectoral adenopathy.   Skin:     General: Skin is warm and dry.   Neurological:      Mental Status: She is alert.   Psychiatric:         Behavior: Behavior normal.         Thought Content: Thought content normal.         Judgment: Judgment normal.         Vitals:    01/17/24 1454   BP: 112/70   Pulse: 112   SpO2: 98%     Body mass index is 25.39 kg/m².      Current Outpatient Medications:     Cholecalciferol (VITAMIN D3) 1000 UNITS capsule, Take 1 capsule by mouth Daily., Disp: , Rfl:     multivitamin with minerals tablet tablet, Take 1 tablet by mouth Daily., Disp: , Rfl:     Progesterone (PROMETRIUM) 100 MG capsule, , Disp: , Rfl:     Testosterone 20 % cream, Use., Disp: , Rfl:     Tirosint 100 MCG capsule, Take 1 capsule by mouth Daily., Disp: 90 capsule, Rfl: 1    cyclobenzaprine (FLEXERIL) 5 MG tablet, Take 1 tablet by mouth At Night As Needed for Muscle Spasms., Disp: 30 tablet, Rfl: 0   Assessment & Plan   Diagnoses and all orders for this visit:    1. Breast pain, left (Primary)  -     Mammo diagnostic digital tomosynthesis left w CAD; Future  -     US breast left limited; Future    2. Left-sided chest wall pain  -     cyclobenzaprine (FLEXERIL) 5 MG tablet; Take 1 tablet by mouth At Night As Needed for Muscle Spasms.  Dispense: 30 tablet; Refill: 0    Other orders  -     ECG 12 Lead          ECG 12 Lead    Date/Time: 1/17/2024 4:11 PM  Performed by: Annie Cameron APRN    Authorized by: Annie Cameron APRN  Comparison: compared with previous ECG from 12/20/2019  Similar to previous ECG  Rhythm: sinus rhythm  Rate: normal  Conduction: conduction normal  ST Segments: ST segments normal  T Waves: T waves normal  QRS axis: normal    Clinical impression: normal ECG                 1. Left breast pain-  check diagnostic mammo with US, exam today benign  2. Left chest wall pain- check EKG, no pain with exertion  -poss muscle strain, will try flexeril, no ETOH or driving with this  Return for further eval if pain continues.

## 2024-01-30 ENCOUNTER — TELEPHONE (OUTPATIENT)
Dept: INTERNAL MEDICINE | Facility: CLINIC | Age: 54
End: 2024-01-30
Payer: COMMERCIAL

## 2024-01-30 NOTE — TELEPHONE ENCOUNTER
Caller: Rodriguez Juliana    Relationship: Self    Best call back number:     [phone] (Mobile)       What orders are you requesting (i.e. lab or imaging): MAMMOGRAM FOR BOTH BREASTS, WANTS IT CODED AS PREVENTATIVE,     In what timeframe would the patient need to come in: ANY     Where will you receive your lab/imaging services: N/A     Additional notes: PLEASE ADVISE.

## 2024-01-30 NOTE — TELEPHONE ENCOUNTER
Called and spoke to patient explained to her that we couldn't change it because there was a problem. Patient understood.

## 2024-02-09 ENCOUNTER — APPOINTMENT (OUTPATIENT)
Dept: WOMENS IMAGING | Facility: HOSPITAL | Age: 54
End: 2024-02-09
Payer: COMMERCIAL

## 2024-02-09 PROCEDURE — 77061 BREAST TOMOSYNTHESIS UNI: CPT | Performed by: RADIOLOGY

## 2024-02-09 PROCEDURE — 77065 DX MAMMO INCL CAD UNI: CPT | Performed by: RADIOLOGY

## 2024-02-09 PROCEDURE — 76642 ULTRASOUND BREAST LIMITED: CPT | Performed by: RADIOLOGY

## 2024-02-09 PROCEDURE — G0279 TOMOSYNTHESIS, MAMMO: HCPCS | Performed by: RADIOLOGY

## 2024-02-13 ENCOUNTER — OFFICE VISIT (OUTPATIENT)
Dept: INTERNAL MEDICINE | Facility: CLINIC | Age: 54
End: 2024-02-13
Payer: COMMERCIAL

## 2024-02-13 VITALS
SYSTOLIC BLOOD PRESSURE: 118 MMHG | HEIGHT: 64 IN | OXYGEN SATURATION: 98 % | DIASTOLIC BLOOD PRESSURE: 78 MMHG | HEART RATE: 108 BPM | BODY MASS INDEX: 25.1 KG/M2 | WEIGHT: 147 LBS

## 2024-02-13 DIAGNOSIS — R11.0 NAUSEA: ICD-10-CM

## 2024-02-13 DIAGNOSIS — R10.13 EPIGASTRIC PAIN: ICD-10-CM

## 2024-02-13 DIAGNOSIS — K21.9 GASTROESOPHAGEAL REFLUX DISEASE, UNSPECIFIED WHETHER ESOPHAGITIS PRESENT: Primary | ICD-10-CM

## 2024-02-13 DIAGNOSIS — K21.9 GASTROESOPHAGEAL REFLUX DISEASE, UNSPECIFIED WHETHER ESOPHAGITIS PRESENT: ICD-10-CM

## 2024-02-13 PROCEDURE — 99214 OFFICE O/P EST MOD 30 MIN: CPT | Performed by: NURSE PRACTITIONER

## 2024-02-13 RX ORDER — PERPHENAZINE 8 MG
TABLET ORAL
COMMUNITY
Start: 2023-11-15

## 2024-02-13 RX ORDER — MAGNESIUM GLYCINATE 100 MG
CAPSULE ORAL
COMMUNITY
Start: 2023-09-06

## 2024-02-14 LAB
H. PYLORI BREATH COLLECTION: NORMAL
UREA BREATH TEST QL: NEGATIVE

## 2024-03-13 ENCOUNTER — TELEPHONE (OUTPATIENT)
Dept: INTERNAL MEDICINE | Facility: CLINIC | Age: 54
End: 2024-03-13
Payer: COMMERCIAL

## 2024-03-13 ENCOUNTER — TELEPHONE (OUTPATIENT)
Dept: SURGERY | Facility: CLINIC | Age: 54
End: 2024-03-13
Payer: COMMERCIAL

## 2024-03-13 DIAGNOSIS — N64.4 BREAST PAIN, LEFT: Primary | ICD-10-CM

## 2024-03-13 NOTE — TELEPHONE ENCOUNTER
New patient chart prepared for Dr Martina Cain to review. Referral from Dr Cameron for left breast pain.

## 2024-03-13 NOTE — TELEPHONE ENCOUNTER
Patient said she is still having breast pain and would like Annie to refer her to a breast specialist.

## 2024-03-15 ENCOUNTER — TELEPHONE (OUTPATIENT)
Dept: SURGERY | Facility: CLINIC | Age: 54
End: 2024-03-15
Payer: COMMERCIAL

## 2024-03-15 RX ORDER — LEVOTHYROXINE SODIUM 100 UG/1
100 CAPSULE ORAL DAILY
Start: 2024-03-15 | End: 2024-03-18 | Stop reason: SDUPTHER

## 2024-03-15 NOTE — TELEPHONE ENCOUNTER
Olum for pt to call back to [facility] new pt appt with david huerta for breast pain

## 2024-03-15 NOTE — TELEPHONE ENCOUNTER
Spoke to pt and got her scheduled for a new pt appt with david huerta for left breast pain on 03/25 @ 1220    Pt stated understanding  Mailed new pt paperwork  Verified our address  Pt aware to come 20 min early if for some reason the packet does not get to her in time

## 2024-03-15 NOTE — TELEPHONE ENCOUNTER
Caller: Juliana Rodriguez    Relationship: Self    Best call back number: [phone]     Requested Prescriptions:   Requested Prescriptions     Pending Prescriptions Disp Refills    Tirosint 100 MCG capsule       Sig: Take 1 capsule by mouth Daily.        Pharm[facility] where [facility] sent[facility] [id number] [place] [facility] [address] [facility] [address] - [phone] PH - [phone] FX     Last office visit with prescribing clinician: 2/13/2024   Last telemedicine visit with prescribing clinician: Visit date not found   Next office visit with prescribing clinician: 4/18/2024     Additional details provided by patient: PATIENT STATED THAT SHE HAS 7 DAY SUPPLY LEFT.     Does the patient have less than a 3 day supply:  [] Yes  [x] No    Would you like a call back once the refill request has been completed: [] Yes [x] No    If the office needs to give you a call back, can they leave a voicemail: [] Yes [x] No    Keila Marin Rep   03/15/24 09:19 EDT       
negative...

## 2024-03-18 RX ORDER — LEVOTHYROXINE SODIUM 100 UG/1
100 CAPSULE ORAL DAILY
Start: 2024-03-18 | End: 2024-03-20 | Stop reason: SDUPTHER

## 2024-03-18 NOTE — TELEPHONE ENCOUNTER
Caller: Juliana Rodriguez    Relationship: Self    Best call back number: [phone]    Requested Prescriptions:   Requested Prescriptions     Pending Prescriptions Disp Refills    Tirosint 100 MCG capsule       Sig: Take 1 capsule by mouth Daily.        [facility] [id number] [place] [facility] [address] [facility] [address] - [phone] PH - [phone] FX     Last office visit with prescribing clinician: 2/13/2024   Last telemedicine visit with prescribing clinician: Visit date not found   Next office visit with prescribing clinician: 4/18/2024     Additional details provided by patient: [facility] STATES [facility] AND IS WANTING TO KNOW IF THE PRESCRIPTION CAN BE RE SENT.     Does the patient have less than a 3 day supply:  [x] Yes  [] No    Would you like a call back once the refill request has been completed: [x] Yes [] No    If the office needs to give you a call back, can they leave a voicemail: [x] Yes [] No    Berta Galloway, RAINER   03/18/24 11:16 EDT

## 2024-03-20 ENCOUNTER — TELEPHONE (OUTPATIENT)
Dept: INTERNAL MEDICINE | Facility: CLINIC | Age: 54
End: 2024-03-20
Payer: COMMERCIAL

## 2024-03-20 RX ORDER — LEVOTHYROXINE SODIUM 100 UG/1
100 CAPSULE ORAL DAILY
Qty: 30 CAPSULE | Refills: 3 | Status: SHIPPED | OUTPATIENT
Start: 2024-03-20

## 2024-03-20 NOTE — TELEPHONE ENCOUNTER
"    Caller: Juliana Rodriguez    Relationship to patient: Self    Best call back number: [phone]    Patient is needing:     PATIENT ASKING FOR THE PRESCRIPTION:  Tirosint 100 MCG capsule     TO BE RESENT TO :  [facility] [address] [place] [facility] [address] [facility] [address] - [phone]  - [phone] FX      PRESCRIPTION WAS SENT TO \"PRINT\"    PLEASE CALL TO CONFIRM WITH PATIENT.         "

## 2024-03-25 ENCOUNTER — TELEPHONE (OUTPATIENT)
Dept: SURGERY | Facility: CLINIC | Age: 54
End: 2024-03-25
Payer: COMMERCIAL

## 2024-03-26 RX ORDER — LEVOTHYROXINE SODIUM 100 UG/1
100 CAPSULE ORAL DAILY
Qty: 30 CAPSULE | Refills: 3 | Status: SHIPPED | OUTPATIENT
Start: 2024-03-26

## 2024-04-10 DIAGNOSIS — Z00.00 HEALTH CARE MAINTENANCE: ICD-10-CM

## 2024-04-10 DIAGNOSIS — E55.9 VITAMIN D DEFICIENCY: Primary | ICD-10-CM

## 2024-04-16 NOTE — PROGRESS NOTES
[facility]     Referring Provider: JOSSY Ybarra     Chief complaint: breast pain     HPI: Ms. Juliana Rodriguez is a 54 yo woman, seen at the request of JOSSY Ybarra, for left breat pain    I personally reviewed her records and summarized her relevant breast history/imagin2020 bilateral screening mammogram at [facility]  The breasts are heterogeneously dense, which may obscure small masses.  There are round and amorphous calcifications with diffuse distribution seen in  both breasts. There are no significant changes from the prior exam(s).  The  parenchyma includes stable nodular asymmetries.  There are no new suspicious  masses, calcifications, or areas of architectural distortion.  IMPRESSION:  Calcifications in both breasts are benign-negative.  Screening mammogram in 1 year is recommended.  BI-RADS Category 2: Benign Finding(s)    2021 bilateral screening mammogram at [facility]  The breasts are heterogeneously dense, which may obscure small masses.  There are areas of asymmetric breast tissue with associated round and punctate  calcifications with diffuse distribution seen in both breasts.  The parenchyma  includes stable nodular asymmetries.  No suspicious masses, suspicious  microcalcifications or areas of architectural distortion are identified.  There  has been no significant change from the prior exam(s).  IMPRESSION:  Areas of asymmetric breast tissue in both breasts are benign-negative.  Screening mammogram in 1 year is recommended.  BI-RADS Category 2: Benign Finding(s)    2022 bilateral screening mammogram at [facility]  The breasts are heterogeneously dense, which may obscure small masses.  The parenchyma includes stable nodular asymmetries and benign calcifications.  No suspicious masses, suspicious microcalcifications or areas of architectural  distortion are identified.  IMPRESSION:  Findings in both breasts are benign-negative.  Screening mammogram in 1 year is  recommended.  BI-RADS Category 2: Benign Finding(s)    8/30/2023 bilateral screening mammogram at [facility]  The breasts are heterogeneously dense, which may obscure small masses.  No suspicious masses, significant calcifications or other abnormalities are seen.  IMPRESSION:  There is no mammographic evidence of malignancy.  Screening mammogram in 1 year is recommended.  BI-RADS Category 1: Negative    2/9/2024 left diagnostic mammogram and left limited breast ultrasound at [facility]  The breast is heterogeneously dense, which may obscure small masses.  There is no radiographic abnormality in the region of the pain in the medial and lateral left breast.  There are no suspicious masses, calcifications or architectural distortions.  There has been no significant change  from the prior exam(s).  LEFT BREAST REALTIME LIMITED BREAST ULTRASOUND  High resolution real-time ultrasound scanning was performed by the ultrasound technologist. Still images were  obtained by the ultrasound technologist and submitted for radiologist review.  There is no sonographic correlate in the area of the pain in the left breast.  There are no suspicious masses,  areas of focal shadowing or distortion seen.  IMPRESSION:  There is no mammographic or sonographic evidence of malignancy. In view of the negative findings, clinical follow  up is recommended for symptoms of breast pain.  A return to screening in 7 months is recommended.  The patient was mailed a notification letter.  BI-RADS Category 1: Negative      She denies any family history of breast cancer.  She has a family history of ovarian cancer in her maternal aunt dx age 50s.    Today she presents with left breast pain for the past 4 months.  She does not sleep in a bra. Wears a sport bra most of the time.  The breast pain is diffuse throughout the breast.  Comes and goes.  She denies any breast lumps, skin changes, or nipple discharge.  She denies any prior history of abnormal mammograms or breast  biopsies.       Review of Systems - Oncology    Medications:    Current Outpatient Medications:     Acetylcysteine (NAC) 500 MG capsule, , Disp: , Rfl:     Cholecalciferol (VITAMIN D3) 1000 UNITS capsule, Take 1 capsule by mouth Daily., Disp: , Rfl:     cyclobenzaprine (FLEXERIL) 5 MG tablet, Take 1 tablet by mouth At Night As Needed for Muscle Spasms., Disp: 30 tablet, Rfl: 0    Magnesium Glycinate 100 MG capsule, , Disp: , Rfl:     multivitamin with minerals tablet tablet, Take 1 tablet by mouth Daily., Disp: , Rfl:     Progesterone (PROMETRIUM) 100 MG capsule, , Disp: , Rfl:     Testosterone 20 % cream, Use., Disp: , Rfl:     Tirosint 100 MCG capsule, Take 1 capsule by mouth Daily., Disp: 30 capsule, Rfl: 3    Allergies:  Allergies   Allergen Reactions    Codeine Nausea And Vomiting    Contrave [Naltrexone-Bupropion Hcl Er] Unknown - Low Severity       Medical history:  Past Medical History:   Diagnosis Date    Hyperlipidemia     Mixed anxiety depressive disorder 10/28/2016       Surgical History:  Past Surgical History:   Procedure Laterality Date    CHOLECYSTECTOMY      DILATATION AND CURETTAGE      THYROID LOBECTOMY Right 2016       Family History:  Family History   Problem Relation Age of Onset    Hyperlipidemia Mother     Hypertension Mother     Thyroid disease Mother     Sleep apnea Mother     Heart disease Father         QUAD bypass at 65 yrs old.     Hyperlipidemia Father     Lung cancer Father     COPD Sister     Rheum arthritis Sister     No Known Problems Brother     No Known Problems Brother     No Known Problems Brother     No Known Problems Maternal Grandmother         old age    No Known Problems Maternal Grandfather     Leukemia Paternal Grandmother     Heart attack Paternal Grandfather         at age 45     Asthma Daughter     No Known Problems Son     Cancer Maternal Aunt         ovarian    Thyroid disease Maternal Aunt         graves disesase    Thyroid disease Maternal Aunt          hypothyroidism       Social History:   Social History     Socioeconomic History    Marital status:      Spouse name: Raymundo    Number of children: 2   Tobacco Use    Smoking status: Never    Smokeless tobacco: Never   Vaping Use    Vaping status: Never Used   Substance and Sexual Activity    Alcohol use: Yes     Comment: rare    Drug use: No    Sexual activity: Yes     Partners: Male     Birth control/protection: Surgical     Patient drinks 0-1 servings of caffeine per day.       GYNECOLOGIC HISTORY:   G: 3. P: 2. AB: 1.  Last menstrual period: 12/25/2023  Age at menarche: 10  Age at first childbirth: 26  Lactation/How long: 10 months   Age at menopause: 49  Total years of oral contraceptive use: 13 years   Total years of hormone replacement therapy: 6 months of testosterone and 3 months on progestrone no longer taking      Physical Exam  There were no vitals filed for this visit.  ECOG 0 - Asymptomatic  General: NAD, well appearing  Psych: a&o x 3, normal mood and affect  Eyes: EOMI, no scleral icterus  ENMT: neck supple without masses or thyromegaly, mucus membranes moist  Resp: normal effort, CTAB  CV: RRR, no murmurs, no edema   GI: soft, NT, ND  MSK: normal gait, normal ROM in bilateral shoulders  Lymph nodes: no cervical, supraclavicular or axillary lymphadenopathy  Breast: asymmetric, large R>L  Right: No visible abnormalities on inspection while seated, with arms raised or hands on hips. No masses, skin changes, or nipple abnormalities.  Left: No visible abnormalities on inspection while seated, with arms raised or hands on hips. No masses, skin changes, or nipple abnormalities.      Assessment:    Breast pain  Dense breast    Discussion:  We had a lengthy discussion about breast pain and how it can sometimes be difficult to treat. We discussed that this is often related to hormonal changes. Caffeine and nicotine can also play a role in breast pain. We also discussed the importance of wearing a good  supportive bra at all times including bedtime.  We discussed additional therapies such as vitamin E supplementation and Primrose oil, and that these may or may not be useful. We discussed using OTC tylenol or ibuprofen for pain control. Finally, we discussed the use of topical NSAID creams.   2. Breast density describes how the breasts look on a mammogram.  Breast and connective tissue are denser than fat and this difference shows up on the mammogram.  Young women often have dense breasts.  As we age, breast become less dense.  Dense breast can make it harder to find breast cancer on the mammogram.  Women with high breast density have an increased risk of breast cancer.  Educational materials regarding breast density were given and reviewed.  Tomosynthesis imaging will be completed with next screening study.    Plan:  1. Wear sports bras during the day when possible. Wear sports bra or tight camisole at night while sleeping.   2. Take Vitamin E, 200 U, twice daily or evening primrose oil once or twice daily   3. Continue to reduce caffeine intake.   4. May use OTC tylenol or ibuprofen, or topical products, for pain control.   5. F/u in 3 months. Call sooner with any questions, concerns       JOSSY Chavez    I have spent 40 mins in face to face time with the patient and in chart review.    CC:  JOSSY Ybarra Sarah C, APRN EMR Dragon/transcription disclaimer:  Dictated using Dragon dictation

## 2024-04-17 LAB
25(OH)D3+25(OH)D2 SERPL-MCNC: 76 NG/ML (ref 30–100)
ALBUMIN SERPL-MCNC: 4.6 G/DL (ref 3.5–5.2)
ALBUMIN/GLOB SERPL: 2 G/DL
ALP SERPL-CCNC: 68 U/L (ref 39–117)
ALT SERPL-CCNC: 27 U/L (ref 1–33)
AST SERPL-CCNC: 17 U/L (ref 1–32)
BASOPHILS # BLD AUTO: 0.06 10*3/MM3 (ref 0–0.2)
BASOPHILS NFR BLD AUTO: 0.8 % (ref 0–1.5)
BILIRUB SERPL-MCNC: 0.3 MG/DL (ref 0–1.2)
BUN SERPL-MCNC: 12 MG/DL (ref 6–20)
BUN/CREAT SERPL: 12.6 (ref 7–25)
CALCIUM SERPL-MCNC: 9.2 MG/DL (ref 8.6–10.5)
CHLORIDE SERPL-SCNC: 104 MMOL/L (ref 98–107)
CHOLEST SERPL-MCNC: 246 MG/DL (ref 0–200)
CHOLEST/HDLC SERPL: 4.47 {RATIO}
CO2 SERPL-SCNC: 23.3 MMOL/L (ref 22–29)
CREAT SERPL-MCNC: 0.95 MG/DL (ref 0.57–1)
EGFRCR SERPLBLD CKD-EPI 2021: 70.9 ML/MIN/1.73
EOSINOPHIL # BLD AUTO: 0.41 10*3/MM3 (ref 0–0.4)
EOSINOPHIL NFR BLD AUTO: 5.2 % (ref 0.3–6.2)
ERYTHROCYTE [DISTWIDTH] IN BLOOD BY AUTOMATED COUNT: 13.4 % (ref 12.3–15.4)
GLOBULIN SER CALC-MCNC: 2.3 GM/DL
GLUCOSE SERPL-MCNC: 93 MG/DL (ref 65–99)
HCT VFR BLD AUTO: 44.3 % (ref 34–46.6)
HDLC SERPL-MCNC: 55 MG/DL (ref 40–60)
HGB BLD-MCNC: 14.9 G/DL (ref 12–15.9)
IMM GRANULOCYTES # BLD AUTO: 0.02 10*3/MM3 (ref 0–0.05)
IMM GRANULOCYTES NFR BLD AUTO: 0.3 % (ref 0–0.5)
LDLC SERPL CALC-MCNC: 164 MG/DL (ref 0–100)
LYMPHOCYTES # BLD AUTO: 2.86 10*3/MM3 (ref 0.7–3.1)
LYMPHOCYTES NFR BLD AUTO: 36.6 % (ref 19.6–45.3)
MCH RBC QN AUTO: 28.9 PG (ref 26.6–33)
MCHC RBC AUTO-ENTMCNC: 33.6 G/DL (ref 31.5–35.7)
MCV RBC AUTO: 86 FL (ref 79–97)
MONOCYTES # BLD AUTO: 0.47 10*3/MM3 (ref 0.1–0.9)
MONOCYTES NFR BLD AUTO: 6 % (ref 5–12)
NEUTROPHILS # BLD AUTO: 4 10*3/MM3 (ref 1.7–7)
NEUTROPHILS NFR BLD AUTO: 51.1 % (ref 42.7–76)
NRBC BLD AUTO-RTO: 0 /100 WBC (ref 0–0.2)
PLATELET # BLD AUTO: 248 10*3/MM3 (ref 140–450)
POTASSIUM SERPL-SCNC: 4.4 MMOL/L (ref 3.5–5.2)
PROT SERPL-MCNC: 6.9 G/DL (ref 6–8.5)
RBC # BLD AUTO: 5.15 10*6/MM3 (ref 3.77–5.28)
SODIUM SERPL-SCNC: 140 MMOL/L (ref 136–145)
TRIGL SERPL-MCNC: 148 MG/DL (ref 0–150)
TSH SERPL DL<=0.005 MIU/L-ACNC: 0.56 UIU/ML (ref 0.27–4.2)
VLDLC SERPL CALC-MCNC: 27 MG/DL (ref 5–40)
WBC # BLD AUTO: 7.82 10*3/MM3 (ref 3.4–10.8)

## 2024-04-18 ENCOUNTER — OFFICE VISIT (OUTPATIENT)
Dept: SURGERY | Facility: CLINIC | Age: 54
End: 2024-04-18
Payer: COMMERCIAL

## 2024-04-18 VITALS
HEIGHT: 64 IN | SYSTOLIC BLOOD PRESSURE: 142 MMHG | OXYGEN SATURATION: 98 % | BODY MASS INDEX: 25 KG/M2 | WEIGHT: 146.4 LBS | DIASTOLIC BLOOD PRESSURE: 84 MMHG | HEART RATE: 90 BPM

## 2024-04-18 DIAGNOSIS — N64.4 BREAST PAIN: Primary | ICD-10-CM

## 2024-04-18 PROCEDURE — 99213 OFFICE O/P EST LOW 20 MIN: CPT | Performed by: NURSE PRACTITIONER

## 2024-04-22 ENCOUNTER — OFFICE VISIT (OUTPATIENT)
Dept: INTERNAL MEDICINE | Facility: CLINIC | Age: 54
End: 2024-04-22
Payer: COMMERCIAL

## 2024-04-22 VITALS
HEART RATE: 103 BPM | DIASTOLIC BLOOD PRESSURE: 68 MMHG | HEIGHT: 64 IN | OXYGEN SATURATION: 98 % | BODY MASS INDEX: 25.44 KG/M2 | SYSTOLIC BLOOD PRESSURE: 110 MMHG | WEIGHT: 149 LBS

## 2024-04-22 DIAGNOSIS — I49.8 FLUTTERING HEART: ICD-10-CM

## 2024-04-22 DIAGNOSIS — Z85.850 HISTORY OF MEDULLARY CARCINOMA OF THYROID: Primary | ICD-10-CM

## 2024-04-22 DIAGNOSIS — E78.5 HYPERLIPIDEMIA, UNSPECIFIED HYPERLIPIDEMIA TYPE: ICD-10-CM

## 2024-04-22 DIAGNOSIS — Z00.00 HEALTH CARE MAINTENANCE: ICD-10-CM

## 2024-04-22 PROBLEM — M94.8X9 POLYCHONDRITIS: Status: ACTIVE | Noted: 2024-04-22

## 2024-04-22 PROCEDURE — 99214 OFFICE O/P EST MOD 30 MIN: CPT | Performed by: NURSE PRACTITIONER

## 2024-04-22 RX ORDER — LEVOTHYROXINE SODIUM 112 UG/1
112 TABLET ORAL DAILY
Start: 2024-04-22

## 2024-04-22 NOTE — PROGRESS NOTES
Subjective   Juliana Rodriguez is a 53 y.o. female.      History of Present Illness   The patient is here today to F/U on lab work. Feeling well.     Left breast pain continues- much better, now a 2 out of 10. Has seen the breast specialist. It does occur less often as well.     Now with heart fluttering, it can last all day. It woke her up in the middle of the night the other night. It will last maybe 1-2 seconds. Typically with relaxing.     The 10-year ASCVD risk score (Gisel BARTH, et al., 2019) is: 1.8%    Values used to calculate the score:      Age: 53 years      Sex: Female      Is Non- : No      Diabetic: No      Tobacco smoker: No      Systolic Blood Pressure: 110 mmHg      Is BP treated: No      HDL Cholesterol: 55 mg/dL      Total Cholesterol: 246 mg/dL    Dtr Alli, Son Raymundo our patients.   The following portions of the patient's history were reviewed and updated as appropriate: allergies, current medications, past family history, past medical history, past social history, past surgical history and problem list.    Review of Systems   Constitutional: Negative.    Respiratory:  Positive for shortness of breath (on occasion, never with exercise). Negative for cough and wheezing.    Cardiovascular:  Positive for palpitations.   Psychiatric/Behavioral:  Negative for suicidal ideas and depressed mood. The patient is nervous/anxious.        Objective   Physical Exam  Constitutional:       Appearance: Normal appearance. She is well-developed.   Neck:      Thyroid: No thyromegaly.   Cardiovascular:      Rate and Rhythm: Normal rate and regular rhythm.      Heart sounds: Normal heart sounds.   Pulmonary:      Effort: Pulmonary effort is normal.      Breath sounds: Normal breath sounds.   Musculoskeletal:      Cervical back: Normal range of motion and neck supple.   Lymphadenopathy:      Cervical: No cervical adenopathy.   Skin:     General: Skin is warm and dry.   Neurological:      Mental  Status: She is alert.   Psychiatric:         Behavior: Behavior normal.         Thought Content: Thought content normal.         Judgment: Judgment normal.         Vitals:    04/22/24 1351   BP: 110/68   Pulse: 103   SpO2: 98%     Body mass index is 25.56 kg/m².    Current Outpatient Medications:     Acetylcysteine (NAC) 500 MG capsule, , Disp: , Rfl:     Cholecalciferol (VITAMIN D3) 1000 UNITS capsule, Take 1 capsule by mouth Daily., Disp: , Rfl:     Magnesium Glycinate 100 MG capsule, , Disp: , Rfl:     multivitamin with minerals tablet tablet, Take 1 tablet by mouth Daily., Disp: , Rfl:     Wild Yam, Dioscorea villosa, (WILD YAM PO), Take  by mouth., Disp: , Rfl:     levothyroxine (Synthroid) 112 MCG tablet, Take 1 tablet by mouth Daily., Disp: , Rfl:      Assessment & Plan   Diagnoses and all orders for this visit:    1. History of medullary carcinoma of thyroid (Primary)  -     levothyroxine (Synthroid) 112 MCG tablet; Take 1 tablet by mouth Daily.  -     Comprehensive Metabolic Panel; Future  -     Lipid Panel With LDL / HDL Ratio; Future  -     TSH Rfx On Abnormal To Free T4; Future    2. Fluttering heart    3. Hyperlipidemia, unspecified hyperlipidemia type  -     Comprehensive Metabolic Panel; Future  -     Lipid Panel With LDL / HDL Ratio; Future  -     TSH Rfx On Abnormal To Free T4; Future    4. Health care maintenance  -     Comprehensive Metabolic Panel; Future  -     Lipid Panel With LDL / HDL Ratio; Future  -     TSH Rfx On Abnormal To Free T4; Future        Orders Only on 04/10/2024   Component Date Value Ref Range Status    25 Hydroxy, Vitamin D 04/16/2024 76.0  30.0 - 100.0 ng/ml Final    Comment: Reference Range for Total Vitamin D 25(OH)  Deficiency <20.0 ng/mL  Insufficiency 21-29 ng/mL  Sufficiency  ng/mL  Toxicity >100 ng/ml      WBC 04/16/2024 7.82  3.40 - 10.80 10*3/mm3 Final    RBC 04/16/2024 5.15  3.77 - 5.28 10*6/mm3 Final    Hemoglobin 04/16/2024 14.9  12.0 - 15.9 g/dL Final     Hematocrit 04/16/2024 44.3  34.0 - 46.6 % Final    MCV 04/16/2024 86.0  79.0 - 97.0 fL Final    MCH 04/16/2024 28.9  26.6 - 33.0 pg Final    MCHC 04/16/2024 33.6  31.5 - 35.7 g/dL Final    RDW 04/16/2024 13.4  12.3 - 15.4 % Final    Platelets 04/16/2024 248  140 - 450 10*3/mm3 Final    Neutrophil Rel % 04/16/2024 51.1  42.7 - 76.0 % Final    Lymphocyte Rel % 04/16/2024 36.6  19.6 - 45.3 % Final    Monocyte Rel % 04/16/2024 6.0  5.0 - 12.0 % Final    Eosinophil Rel % 04/16/2024 5.2  0.3 - 6.2 % Final    Basophil Rel % 04/16/2024 0.8  0.0 - 1.5 % Final    Neutrophils Absolute 04/16/2024 4.00  1.70 - 7.00 10*3/mm3 Final    Lymphocytes Absolute 04/16/2024 2.86  0.70 - 3.10 10*3/mm3 Final    Monocytes Absolute 04/16/2024 0.47  0.10 - 0.90 10*3/mm3 Final    Eosinophils Absolute 04/16/2024 0.41 (H)  0.00 - 0.40 10*3/mm3 Final    Basophils Absolute 04/16/2024 0.06  0.00 - 0.20 10*3/mm3 Final    Immature Granulocyte Rel % 04/16/2024 0.3  0.0 - 0.5 % Final    Immature Grans Absolute 04/16/2024 0.02  0.00 - 0.05 10*3/mm3 Final    nRBC 04/16/2024 0.0  0.0 - 0.2 /100 WBC Final    Glucose 04/16/2024 93  65 - 99 mg/dL Final    BUN 04/16/2024 12  6 - 20 mg/dL Final    Creatinine 04/16/2024 0.95  0.57 - 1.00 mg/dL Final    EGFR Result 04/16/2024 70.9  >60.0 mL/min/1.73 Final    Comment: GFR Normal >60  Chronic Kidney Disease <60  Kidney Failure <15      BUN/Creatinine Ratio 04/16/2024 12.6  7.0 - 25.0 Final    Sodium 04/16/2024 140  136 - 145 mmol/L Final    Potassium 04/16/2024 4.4  3.5 - 5.2 mmol/L Final    Chloride 04/16/2024 104  98 - 107 mmol/L Final    Total CO2 04/16/2024 23.3  22.0 - 29.0 mmol/L Final    Calcium 04/16/2024 9.2  8.6 - 10.5 mg/dL Final    Total Protein 04/16/2024 6.9  6.0 - 8.5 g/dL Final    Albumin 04/16/2024 4.6  3.5 - 5.2 g/dL Final    Globulin 04/16/2024 2.3  gm/dL Final    A/G Ratio 04/16/2024 2.0  g/dL Final    Total Bilirubin 04/16/2024 0.3  0.0 - 1.2 mg/dL Final    Alkaline Phosphatase 04/16/2024 68   39 - 117 U/L Final    AST (SGOT) 04/16/2024 17  1 - 32 U/L Final    ALT (SGPT) 04/16/2024 27  1 - 33 U/L Final    Total Cholesterol 04/16/2024 246 (H)  0 - 200 mg/dL Final    Comment: Cholesterol Reference Ranges  (U.S. Department of Health and Human Services ATP III  Classifications)  Desirable          <200 mg/dL  Borderline High    200-239 mg/dL  High Risk          >240 mg/dL  Triglyceride Reference Ranges  (U.S. Department of Health and Human Services ATP III  Classifications)  Normal           <150 mg/dL  Borderline High  150-199 mg/dL  High             200-499 mg/dL  Very High        >500 mg/dL  HDL Reference Ranges  (U.S. Department of Health and Human Services ATP III  Classifications)  Low     <40 mg/dl (major risk factor for CHD)  High    >60 mg/dl ('negative' risk factor for CHD)  LDL Reference Ranges  (U.S. Department of Health and Human Services ATP III  Classifications)  Optimal          <100 mg/dL  Near Optimal     100-129 mg/dL  Borderline High  130-159 mg/dL  High             160-189 mg/dL  Very High        >189 mg/dL      Triglycerides 04/16/2024 148  0 - 150 mg/dL Final    HDL Cholesterol 04/16/2024 55  40 - 60 mg/dL Final    VLDL Cholesterol Isiah 04/16/2024 27  5 - 40 mg/dL Final    LDL Chol Calc (NIH) 04/16/2024 164 (H)  0 - 100 mg/dL Final    Chol/HDL Ratio 04/16/2024 4.47   Final    TSH 04/16/2024 0.558  0.270 - 4.200 uIU/mL Final           1. Hypothyroidism- insurance would not cover tirosint (100mcg), she has been on levo for 2 weeks and is now on levo 112mcg daily, she will have lab with functional medicine in 3 months.   If heart fluttering continues will also recheck TSH earlier   2. Heart fluttering - order holter monitor (defers for now), likely due to stress, suggest BB (she defers for now), discussed stress management.   3. HPL- she has cut back on her ice cream, exercising intermittently, mediterranean style diet, she has had CT calcium study, score of 0, need copy of this

## 2024-05-01 DIAGNOSIS — I49.8 FLUTTERING HEART: Primary | ICD-10-CM

## 2024-05-14 ENCOUNTER — TELEPHONE (OUTPATIENT)
Dept: INTERNAL MEDICINE | Facility: CLINIC | Age: 54
End: 2024-05-14

## 2024-05-14 NOTE — TELEPHONE ENCOUNTER
Caller: Michael Juliana    Relationship: Self    Best call back number: [phone]     What orders are you requesting (i.e. lab or imaging): BLOOD WORK      Additional notes:     PATIENT HAS CHANGED HER MIND AND WOULD LIKE FOR YOU TO PUT IN ORDERS FOR HER TO GET HER BLOOD WORK DONE FOR HER THYROID.    PLEASE CALL AND SCHEDULE AN APPOINTMENT FOR LABS

## 2024-05-15 DIAGNOSIS — E66.3 OVERWEIGHT: ICD-10-CM

## 2024-05-15 DIAGNOSIS — I49.8 FLUTTERING HEART: Primary | ICD-10-CM

## 2024-05-25 LAB
T3FREE SERPL-MCNC: 2.6 PG/ML (ref 2–4.4)
T4 FREE SERPL-MCNC: 1.58 NG/DL (ref 0.93–1.7)
TSH SERPL DL<=0.005 MIU/L-ACNC: 0.46 UIU/ML (ref 0.27–4.2)

## 2024-07-03 NOTE — PROGRESS NOTES
RM:________    Referral Provider: Annie Cameron, JOSSY Cameron, Annie GARCIA, APRN    NEW PATIENT/ CONSULT  PREVIOUS CARDIOLOGIST: ______________________________    CARDIAC TESTING: __________________________________________________    : 1970   AGE: 53 y.o.    2024  REASON FOR VISIT/  CC:      WT: ____________ BP: __________L __________R/ HR_______________    ALLERGIES:  Codeine and Contrave [naltrexone-bupropion hcl er]  SMOKING HISTORY  Social History     Tobacco Use    Smoking status: Never     Passive exposure: Never    Smokeless tobacco: Never   Vaping Use    Vaping status: Never Used   Substance Use Topics    Alcohol use: Yes     Comment: rare    Drug use: No          H/O: MI_____   STROKE________   GOUT_____   ANEMIA______     CAROTID________ HIV____ CAD_______ HYPERCHOL _____    H/O: CHF _____   RF____ DM___ HTN_______PVD____THYROID DISEASE_______    PMH: GI ____   HEPATITIS ___ KIDNEY DISEASE ___ LUNG DISEASE _______     SLEEP APNEA ____ BLOOD CLOTS ____ DVT ____ VEIN STRIPPING ___________      STOP BANG _________ (CARDIO ONCOLOGY ONLY)    CANCER _________________________________ CHEMO/ RADIATION__________

## 2024-07-09 ENCOUNTER — OFFICE VISIT (OUTPATIENT)
Age: 54
End: 2024-07-09
Payer: COMMERCIAL

## 2024-07-09 VITALS
HEIGHT: 64 IN | BODY MASS INDEX: 25.81 KG/M2 | DIASTOLIC BLOOD PRESSURE: 90 MMHG | WEIGHT: 151.2 LBS | SYSTOLIC BLOOD PRESSURE: 146 MMHG | HEART RATE: 87 BPM

## 2024-07-09 DIAGNOSIS — R06.09 DOE (DYSPNEA ON EXERTION): ICD-10-CM

## 2024-07-09 DIAGNOSIS — Z91.89 CHRONIC CHEST PAIN WITH LOW TO MODERATE RISK FOR CAD: Primary | ICD-10-CM

## 2024-07-09 DIAGNOSIS — R07.9 CHRONIC CHEST PAIN WITH LOW TO MODERATE RISK FOR CAD: Primary | ICD-10-CM

## 2024-07-09 DIAGNOSIS — G89.29 CHRONIC CHEST PAIN WITH LOW TO MODERATE RISK FOR CAD: Primary | ICD-10-CM

## 2024-07-09 PROCEDURE — 93000 ELECTROCARDIOGRAM COMPLETE: CPT | Performed by: INTERNAL MEDICINE

## 2024-07-09 PROCEDURE — 99204 OFFICE O/P NEW MOD 45 MIN: CPT | Performed by: INTERNAL MEDICINE

## 2024-07-09 NOTE — PROGRESS NOTES
"      CARDIOLOGY    Ebony Dawn MD    ENCOUNTER DATE:  07/09/2024    Juliana Rodriguez / 53 y.o. / female        CHIEF COMPLAINT / REASON FOR OFFICE VISIT     Chest Pain and Palpitations      HISTORY OF PRESENT ILLNESS       HPI    Juliana Rodriguez is a 53 y.o. female     This is a nice lady whose  is a patient of mine. She saw Velvet Funes MD, in 2019 and had an unremarkable Holter monitor. She reports having had a calcium score and I do not have a copy of it, but she was told her calcium score was 0. She did have a CTA of the chest in the [employer] system in 2022 which was reportedly normal from a cardiac standpoint and there was no pulmonary embolus.     She comes in today because she has been noticing some left-sided chest discomfort which wraps around to her back and sometimes she notices discomfort in her left arm as well. This seems to be exertional. She also has dyspnea on exertion. She is trying to exercise more but says her blood pressure is going up with exertion.         VITAL SIGNS     Visit Vitals  /90 (BP Location: Left arm)   Pulse 87   Ht 162.6 cm (64.02\")   Wt 68.6 kg (151 lb 3.2 oz)   BMI 25.94 kg/m²         Wt Readings from Last 3 Encounters:   07/09/24 68.6 kg (151 lb 3.2 oz)   04/22/24 67.6 kg (149 lb)   04/18/24 66.4 kg (146 lb 6.4 oz)     Body mass index is 25.94 kg/m².      PHYSICAL EXAMINATION     Constitutional:       General: Not in acute distress.  Neck:      Vascular: No carotid bruit or JVD.   Pulmonary:      Effort: Pulmonary effort is normal.      Breath sounds: Normal breath sounds.   Cardiovascular:      Normal rate. Regular rhythm.      Murmurs: There is no murmur.   Psychiatric:         Mood and Affect: Mood and affect normal.           REVIEWED DATA       ECG 12 Lead    Date/Time: 7/9/2024 1:51 PM  Performed by: Ebony Dawn MD    Authorized by: Ebony Dawn MD  Comparison: compared with previous ECG from 1/17/2024  Similar to previous ECG  Rhythm: sinus " rhythm  BPM: 87  Conduction: conduction normal  ST Segments: ST segments normal  T Waves: T waves normal    Clinical impression: normal ECG            Lipid Panel          10/13/2023    10:37 4/16/2024    10:34   Lipid Panel   Total Cholesterol 249  246    Triglycerides 98  148    HDL Cholesterol 58  55    VLDL Cholesterol 17  27    LDL Cholesterol  174  164        Lab Results   Component Value Date    GLUCOSE 93 04/16/2024    BUN 12 04/16/2024    CREATININE 0.95 04/16/2024    EGFRRESULT 70.9 04/16/2024    BCR 12.6 04/16/2024    K 4.4 04/16/2024    CO2 23.3 04/16/2024    CALCIUM 9.2 04/16/2024    PROTENTOTREF 6.9 04/16/2024    ALBUMIN 4.6 04/16/2024    BILITOT 0.3 04/16/2024    AST 17 04/16/2024    ALT 27 04/16/2024       ASSESSMENT & PLAN      Diagnosis Plan   1. Chronic chest pain with low to moderate risk for CAD  Treadmill Stress Test    Adult Transthoracic Echo Complete W/ Cont if Necessary Per Protocol      2. RAYMOND (dyspnea on exertion)  Treadmill Stress Test    Adult Transthoracic Echo Complete W/ Cont if Necessary Per Protocol            Chest pain and dyspnea on exertion. I recommend a treadmill stress test and echocardiogram. If those are normal, then I would feel reassured, especially with her history that she reports of having had a calcium scan that was 0, that her symptoms are unlikely to be ischemic.   Hypertension. Her blood pressure is high today and I am concerned about this. I have asked her to start monitoring her blood pressure. I did mention she should try to cut out sodium and continue with exercise. Will monitor her blood pressure when she comes in for the stress test.   Family history of coronary disease. Her father had bypass surgery at the age of 62.     One of my nurses or I will go over the results of the echo and the treadmill when they are available.       Orders Placed This Encounter   Procedures    Treadmill Stress Test     Standing Status:   Future     Standing Expiration Date:    7/9/2025     Order Specific Question:   Reason for exam?     Answer:   Chest Pain     Order Specific Question:   Release to patient     Answer:   Routine Release [[id number]]    ECG 12 Lead     This order was created via procedure documentation     Order Specific Question:   Release to patient     Answer:   Routine Release [[id number]]    Adult Transthoracic Echo Complete W/ Cont if Necessary Per Protocol     Standing Status:   Future     Standing Expiration Date:   7/9/2025     Order Specific Question:   Reason for exam?     Answer:   Chest Pain     Order Specific Question:   Release to patient     Answer:   Routine Release [[id number]]           MEDICATIONS         Discharge Medications            Accurate as of July 9, 2024  1:54 PM. If you have any questions, ask your nurse or doctor.                Continue These Medications        Instructions Start Date   levothyroxine 112 MCG tablet  Commonly known as: Synthroid   112 mcg, Oral, Daily      Magnesium Glycinate 100 MG capsule       multivitamin with minerals tablet tablet   1 tablet, Oral, Daily       MG capsule  Generic drug: Acetylcysteine       Vitamin D3 25 MCG (1000 UT) capsule   1 capsule, Oral, Daily      WILD YAM PO   Oral, Daily                 Ebony Dawn MD  07/09/24  13:54 EDT    Part of this note may be an electronic transcription/translation of spoken language to printed text using the Dragon dictation system.

## 2024-08-29 ENCOUNTER — TELEPHONE (OUTPATIENT)
Dept: CARDIOLOGY | Facility: CLINIC | Age: 54
End: 2024-08-29
Payer: COMMERCIAL

## 2024-08-30 ENCOUNTER — HOSPITAL ENCOUNTER (OUTPATIENT)
Dept: CARDIOLOGY | Facility: HOSPITAL | Age: 54
Discharge: HOME | End: 2024-08-30
Payer: COMMERCIAL

## 2024-08-30 ENCOUNTER — TELEPHONE (OUTPATIENT)
Dept: CARDIOLOGY | Facility: CLINIC | Age: 54
End: 2024-08-30
Payer: COMMERCIAL

## 2024-08-30 ENCOUNTER — HOSPITAL ENCOUNTER (OUTPATIENT)
Dept: CARDIOLOGY | Facility: HOSPITAL | Age: 54
Discharge: HOME | End: 2024-08-30
Admitting: INTERNAL MEDICINE
Payer: COMMERCIAL

## 2024-08-30 VITALS
SYSTOLIC BLOOD PRESSURE: 110 MMHG | WEIGHT: 151 LBS | HEART RATE: 94 BPM | DIASTOLIC BLOOD PRESSURE: 80 MMHG | OXYGEN SATURATION: 96 % | HEIGHT: 64 IN | BODY MASS INDEX: 25.78 KG/M2

## 2024-08-30 DIAGNOSIS — R07.9 CHRONIC CHEST PAIN WITH LOW TO MODERATE RISK FOR CAD: ICD-10-CM

## 2024-08-30 DIAGNOSIS — Z91.89 CHRONIC CHEST PAIN WITH LOW TO MODERATE RISK FOR CAD: ICD-10-CM

## 2024-08-30 DIAGNOSIS — R06.09 DOE (DYSPNEA ON EXERTION): ICD-10-CM

## 2024-08-30 DIAGNOSIS — G89.29 CHRONIC CHEST PAIN WITH LOW TO MODERATE RISK FOR CAD: ICD-10-CM

## 2024-08-30 LAB
AORTIC ARCH: 3 CM
AORTIC DIMENSIONLESS INDEX: 0.7 (DI)
ASCENDING AORTA: 2.5 CM
BH CV ECHO MEAS - ACS: 1.65 CM
BH CV ECHO MEAS - AO MAX PG: 6.6 MMHG
BH CV ECHO MEAS - AO MEAN PG: 4 MMHG
BH CV ECHO MEAS - AO ROOT DIAM: 2.7 CM
BH CV ECHO MEAS - AO V2 MAX: 128 CM/SEC
BH CV ECHO MEAS - AO V2 VTI: 24.5 CM
BH CV ECHO MEAS - AVA(I,D): 1.93 CM2
BH CV ECHO MEAS - EDV(CUBED): 50.7 ML
BH CV ECHO MEAS - EDV(MOD-SP2): 34 ML
BH CV ECHO MEAS - EDV(MOD-SP4): 43 ML
BH CV ECHO MEAS - EF(MOD-BP): 56.5 %
BH CV ECHO MEAS - EF(MOD-SP2): 55.9 %
BH CV ECHO MEAS - EF(MOD-SP4): 60.5 %
BH CV ECHO MEAS - ESV(CUBED): 11.5 ML
BH CV ECHO MEAS - ESV(MOD-SP2): 15 ML
BH CV ECHO MEAS - ESV(MOD-SP4): 17 ML
BH CV ECHO MEAS - FS: 39 %
BH CV ECHO MEAS - IVS/LVPW: 1 CM
BH CV ECHO MEAS - IVSD: 0.9 CM
BH CV ECHO MEAS - LAT PEAK E' VEL: 11.3 CM/SEC
BH CV ECHO MEAS - LV DIASTOLIC VOL/BSA (35-75): 24.8 CM2
BH CV ECHO MEAS - LV MASS(C)D: 96.9 GRAMS
BH CV ECHO MEAS - LV MAX PG: 3.6 MMHG
BH CV ECHO MEAS - LV MEAN PG: 2 MMHG
BH CV ECHO MEAS - LV SYSTOLIC VOL/BSA (12-30): 9.8 CM2
BH CV ECHO MEAS - LV V1 MAX: 94.6 CM/SEC
BH CV ECHO MEAS - LV V1 VTI: 17.6 CM
BH CV ECHO MEAS - LVIDD: 3.7 CM
BH CV ECHO MEAS - LVIDS: 2.26 CM
BH CV ECHO MEAS - LVOT AREA: 2.7 CM2
BH CV ECHO MEAS - LVOT DIAM: 1.85 CM
BH CV ECHO MEAS - LVPWD: 0.9 CM
BH CV ECHO MEAS - MED PEAK E' VEL: 7.1 CM/SEC
BH CV ECHO MEAS - MV A DUR: 0.1 SEC
BH CV ECHO MEAS - MV A MAX VEL: 75.9 CM/SEC
BH CV ECHO MEAS - MV DEC SLOPE: 581.1 CM/SEC2
BH CV ECHO MEAS - MV DEC TIME: 0.16 SEC
BH CV ECHO MEAS - MV E MAX VEL: 56.9 CM/SEC
BH CV ECHO MEAS - MV E/A: 0.75
BH CV ECHO MEAS - MV MAX PG: 3.4 MMHG
BH CV ECHO MEAS - MV MEAN PG: 1.75 MMHG
BH CV ECHO MEAS - MV P1/2T: 35.6 MSEC
BH CV ECHO MEAS - MV V2 VTI: 17.3 CM
BH CV ECHO MEAS - MVA(P1/2T): 6.2 CM2
BH CV ECHO MEAS - MVA(VTI): 2.7 CM2
BH CV ECHO MEAS - PA ACC TIME: 0.08 SEC
BH CV ECHO MEAS - PA V2 MAX: 82.8 CM/SEC
BH CV ECHO MEAS - PULM A REVS DUR: 0.09 SEC
BH CV ECHO MEAS - PULM A REVS VEL: 37.3 CM/SEC
BH CV ECHO MEAS - PULM DIAS VEL: 31.5 CM/SEC
BH CV ECHO MEAS - PULM S/D: 1.54
BH CV ECHO MEAS - PULM SYS VEL: 48.5 CM/SEC
BH CV ECHO MEAS - QP/QS: 0.96
BH CV ECHO MEAS - RAP SYSTOLE: 3 MMHG
BH CV ECHO MEAS - RV MAX PG: 2 MMHG
BH CV ECHO MEAS - RV V1 MAX: 70.7 CM/SEC
BH CV ECHO MEAS - RV V1 VTI: 14.6 CM
BH CV ECHO MEAS - RVOT DIAM: 1.99 CM
BH CV ECHO MEAS - RVSP: 20.7 MMHG
BH CV ECHO MEAS - SUP REN AO DIAM: 1.6 CM
BH CV ECHO MEAS - SV(LVOT): 47.4 ML
BH CV ECHO MEAS - SV(MOD-SP2): 19 ML
BH CV ECHO MEAS - SV(MOD-SP4): 26 ML
BH CV ECHO MEAS - SV(RVOT): 45.4 ML
BH CV ECHO MEAS - SVI(LVOT): 27.3 ML/M2
BH CV ECHO MEAS - SVI(MOD-SP2): 10.9 ML/M2
BH CV ECHO MEAS - SVI(MOD-SP4): 15 ML/M2
BH CV ECHO MEAS - TAPSE (>1.6): 2.3 CM
BH CV ECHO MEAS - TR MAX PG: 17.7 MMHG
BH CV ECHO MEAS - TR MAX VEL: 210.4 CM/SEC
BH CV ECHO MEASUREMENTS AVERAGE E/E' RATIO: 6.18
BH CV STRESS BP STAGE 1: NORMAL
BH CV STRESS BP STAGE 2: NORMAL
BH CV STRESS BP STAGE 3: NORMAL
BH CV STRESS DURATION MIN STAGE 1: 3
BH CV STRESS DURATION MIN STAGE 2: 3
BH CV STRESS DURATION MIN STAGE 3: 3
BH CV STRESS DURATION MIN STAGE 4: 1
BH CV STRESS DURATION SEC STAGE 1: 0
BH CV STRESS DURATION SEC STAGE 2: 0
BH CV STRESS DURATION SEC STAGE 3: 0
BH CV STRESS DURATION SEC STAGE 4: 0
BH CV STRESS GRADE STAGE 1: 10
BH CV STRESS GRADE STAGE 2: 12
BH CV STRESS GRADE STAGE 3: 14
BH CV STRESS GRADE STAGE 4: 16
BH CV STRESS HR STAGE 1: 130
BH CV STRESS HR STAGE 2: 154
BH CV STRESS HR STAGE 3: 164
BH CV STRESS HR STAGE 4: 174
BH CV STRESS METS STAGE 1: 5
BH CV STRESS METS STAGE 2: 7.5
BH CV STRESS METS STAGE 3: 10
BH CV STRESS METS STAGE 4: 11
BH CV STRESS PROTOCOL 1: NORMAL
BH CV STRESS RECOVERY BP: NORMAL MMHG
BH CV STRESS RECOVERY HR: 106 BPM
BH CV STRESS SPEED STAGE 1: 1.7
BH CV STRESS SPEED STAGE 2: 2.5
BH CV STRESS SPEED STAGE 3: 3.4
BH CV STRESS SPEED STAGE 4: 4.2
BH CV STRESS STAGE 1: 1
BH CV STRESS STAGE 2: 2
BH CV STRESS STAGE 3: 3
BH CV STRESS STAGE 4: 4
BH CV VAS BP LEFT ARM: NORMAL MMHG
BH CV XLRA - RV BASE: 2.24 CM
BH CV XLRA - RV LENGTH: 6.8 CM
BH CV XLRA - RV MID: 2.3 CM
BH CV XLRA - TDI S': 12.7 CM/SEC
LEFT ATRIUM VOLUME INDEX: 10.2 ML/M2
MAXIMAL PREDICTED HEART RATE: 165 BPM
PERCENT MAX PREDICTED HR: 105.45 %
SINUS: 2.6 CM
STJ: 2.4 CM
STRESS BASELINE BP: NORMAL MMHG
STRESS BASELINE HR: 97 BPM
STRESS PERCENT HR: 124 %
STRESS POST ESTIMATED WORKLOAD: 11 METS
STRESS POST EXERCISE DUR MIN: 10 MIN
STRESS POST EXERCISE DUR SEC: 0 SEC
STRESS POST PEAK BP: NORMAL MMHG
STRESS POST PEAK HR: 174 BPM
STRESS TARGET HR: 140 BPM

## 2024-08-30 PROCEDURE — 93306 TTE W/DOPPLER COMPLETE: CPT

## 2024-08-30 PROCEDURE — 93017 CV STRESS TEST TRACING ONLY: CPT

## 2024-08-30 NOTE — TELEPHONE ENCOUNTER
Please let her know that the echo and the treadmill came in normal.  She has had a previous calcium score of 0.  Follow-up with her PCP.

## 2024-09-03 NOTE — TELEPHONE ENCOUNTER
Notified pt of results and recommendations. She verbalized understanding.    Thank you,    Griselda Gloria, RN  Triage [facility]  09/03/24 09:19 EDT

## 2024-09-25 DIAGNOSIS — Z85.850 HISTORY OF MEDULLARY CARCINOMA OF THYROID: ICD-10-CM

## 2024-09-25 RX ORDER — LEVOTHYROXINE SODIUM 112 UG/1
112 TABLET ORAL DAILY
Start: 2024-09-25 | End: 2024-09-27 | Stop reason: SDUPTHER

## 2024-09-27 RX ORDER — LEVOTHYROXINE SODIUM 112 UG/1
112 TABLET ORAL DAILY
Qty: 90 TABLET | Refills: 1 | Status: SHIPPED | OUTPATIENT
Start: 2024-09-27 | End: 2024-09-30 | Stop reason: SDUPTHER

## 2024-09-30 RX ORDER — LEVOTHYROXINE SODIUM 112 UG/1
112 TABLET ORAL DAILY
Qty: 90 TABLET | Refills: 1 | Status: SHIPPED | OUTPATIENT
Start: 2024-09-30

## 2024-10-24 ENCOUNTER — TELEPHONE (OUTPATIENT)
Dept: INTERNAL MEDICINE | Facility: CLINIC | Age: 54
End: 2024-10-24

## 2024-10-24 NOTE — TELEPHONE ENCOUNTER
Caller: Juliana Rodriguez    Relationship: Self    Best call back number: [phone]      What medication are you requesting: THYROID MEDICATION INCREASED    What are your current symptoms: FATIGUE, WEIGHT GAIN    Have you been treated for these symptoms before:   [] Yes  [] No    If a prescription is needed, what is your preferred pharmacy and phone number: [facility] [id number] [place] [address] [facility] [address] - [phone]  - [phone]     Additional notes:  SHE NOTICED THAT HER TSH WAS UP ON HER BLOOD WORK.  SHE IS FEELING FATIGUE AND HAS WEIGHT GAIN.  WOULD YOU INCREASE HER THYROID MEDICATION PLEASE.  SHE IS GOING OUT OF TOWN SATURDAY AND WOULD LIKE TO HAVE IT PRIOR TO THAT.

## 2024-11-05 ENCOUNTER — OFFICE VISIT (OUTPATIENT)
Dept: INTERNAL MEDICINE | Facility: CLINIC | Age: 54
End: 2024-11-05
Payer: COMMERCIAL

## 2024-11-05 VITALS
HEIGHT: 64 IN | WEIGHT: 154.5 LBS | OXYGEN SATURATION: 98 % | DIASTOLIC BLOOD PRESSURE: 86 MMHG | TEMPERATURE: 97.5 F | HEART RATE: 100 BPM | BODY MASS INDEX: 26.38 KG/M2 | SYSTOLIC BLOOD PRESSURE: 116 MMHG

## 2024-11-05 DIAGNOSIS — Z00.00 HEALTH CARE MAINTENANCE: Primary | ICD-10-CM

## 2024-11-05 DIAGNOSIS — R59.1 LYMPHADENOPATHY: ICD-10-CM

## 2024-11-05 DIAGNOSIS — M70.62 TROCHANTERIC BURSITIS OF LEFT HIP: ICD-10-CM

## 2024-11-05 DIAGNOSIS — N93.9 ABNORMAL UTERINE BLEEDING: ICD-10-CM

## 2024-11-05 DIAGNOSIS — Z85.850 HISTORY OF MEDULLARY CARCINOMA OF THYROID: ICD-10-CM

## 2024-11-05 DIAGNOSIS — K21.9 GASTROESOPHAGEAL REFLUX DISEASE, UNSPECIFIED WHETHER ESOPHAGITIS PRESENT: ICD-10-CM

## 2024-11-05 DIAGNOSIS — R07.9 LEFT-SIDED CHEST PAIN: ICD-10-CM

## 2024-11-05 DIAGNOSIS — E78.5 HYPERLIPIDEMIA, UNSPECIFIED HYPERLIPIDEMIA TYPE: ICD-10-CM

## 2024-11-05 PROCEDURE — 99396 PREV VISIT EST AGE 40-64: CPT | Performed by: NURSE PRACTITIONER

## 2024-11-05 PROCEDURE — 99214 OFFICE O/P EST MOD 30 MIN: CPT | Performed by: NURSE PRACTITIONER

## 2024-11-05 RX ORDER — LEVOTHYROXINE SODIUM 125 UG/1
TABLET ORAL
Qty: 43 TABLET | Refills: 2 | Status: SHIPPED | OUTPATIENT
Start: 2024-11-05

## 2024-11-05 RX ORDER — LEVOTHYROXINE SODIUM 112 UG/1
TABLET ORAL
Qty: 48 TABLET | Refills: 2 | Status: SHIPPED | OUTPATIENT
Start: 2024-11-05

## 2024-11-05 NOTE — PROGRESS NOTES
Subjective   Juliana Rodriguez is a 53 y.o. female.     History of Present Illness   The patient is here today for CPE and lab work F/U. She is feeling ok.     Hypothyroidism/hx of thyroid ca- she has gained weight and feels totally sluggish.     Continues to have intermittent left sided chest pain at times with activity other times with out. Neg stress test 8/2024. Started in 2021. She has not tried PT. She does have chest pain.   Able to exercise with out SOB.     Last period was December than had another 19 th October, and then again late October started spotting and it continues.     Left hip pain since [place], targeted. Aches, mostly up and down stairs. Intermittent.     BMI is >= 25 and <30. (Overweight) The following options were offered after discussion;: exercise counseling/recommendations and nutrition counseling/recommendations     Dtr Alli, Son Raymundo our patients.   The following portions of the patient's history were reviewed and updated as appropriate: allergies, current medications, past family history, past medical history, past social history, past surgical history and problem list.    Review of Systems   Constitutional:  Negative for chills, fatigue and fever.   HENT:  Negative for ear pain, rhinorrhea and sore throat.    Eyes: Negative.    Respiratory:  Negative for cough and shortness of breath.    Cardiovascular:  Positive for chest pain.   Gastrointestinal:  Positive for diarrhea (at baseline). Negative for abdominal distention, abdominal pain, anal bleeding, blood in stool, constipation, nausea, rectal pain, vomiting, GERD and indigestion.   Endocrine: Negative for cold intolerance and heat intolerance.   Genitourinary:  Negative for breast discharge, breast lump, breast pain, difficulty urinating, dyspareunia, dysuria, flank pain, frequency, genital sores, hematuria, pelvic pain and urgency.   Musculoskeletal: Negative.    Skin: Negative.    Allergic/Immunologic: Negative for environmental  allergies and food allergies.   Neurological: Negative.    Hematological:  Positive for adenopathy (stable).   Psychiatric/Behavioral:  Negative for dysphoric mood and suicidal ideas. The patient is not nervous/anxious.        Objective   Physical Exam  Constitutional:       Appearance: Normal appearance. She is well-developed.      Comments: Body mass index is 26.51 kg/m².     HENT:      Right Ear: Hearing, tympanic membrane, ear canal and external ear normal.      Left Ear: Hearing, tympanic membrane, ear canal and external ear normal.      Nose: Nose normal.      Mouth/Throat:      Pharynx: Uvula midline.   Eyes:      General: Lids are normal.      Conjunctiva/sclera: Conjunctivae normal.      Pupils: Pupils are equal, round, and reactive to light.   Neck:      Thyroid: No thyroid mass or thyromegaly.      Vascular: No carotid bruit.      Trachea: Trachea normal.   Cardiovascular:      Rate and Rhythm: Normal rate and regular rhythm.      Pulses: Normal pulses.      Heart sounds: Normal heart sounds.   Pulmonary:      Effort: Pulmonary effort is normal.      Breath sounds: Normal breath sounds.   Abdominal:      General: Bowel sounds are normal.      Palpations: Abdomen is soft.      Tenderness: There is no abdominal tenderness.   Musculoskeletal:         General: Normal range of motion.      Cervical back: Normal range of motion.   Lymphadenopathy:      Cervical: Cervical adenopathy (few scattered shotty lymph nodes.) present.      Upper Body:      Right upper body: No supraclavicular adenopathy.      Left upper body: No supraclavicular adenopathy.      Lower Body: No right inguinal adenopathy. No left inguinal adenopathy.   Skin:     General: Skin is warm and dry.   Neurological:      Mental Status: She is alert and oriented to person, place, and time.      GCS: GCS eye subscore is 4. GCS verbal subscore is 5. GCS motor subscore is 6.      Cranial Nerves: No cranial nerve deficit.      Sensory: No sensory  deficit.      Deep Tendon Reflexes:      Reflex Scores:       Patellar reflexes are 2+ on the right side and 2+ on the left side.  Psychiatric:         Speech: Speech normal.         Behavior: Behavior normal. Behavior is cooperative.         Thought Content: Thought content normal.         Judgment: Judgment normal.         Vitals:    11/05/24 1318   BP: 116/86   Pulse: 100   Temp: 97.5 °F (36.4 °C)   SpO2: 98%     Body mass index is 26.51 kg/m².      Assessment & Plan   Diagnoses and all orders for this visit:    1. Health care maintenance (Primary)    2. History of medullary carcinoma of thyroid  -     levothyroxine (Synthroid) 112 MCG tablet; Take 4 days a week alt with 126 mcg daily  Dispense: 48 tablet; Refill: 2  -     Cancel: TSH Rfx On Abnormal To Free T4; Future  -     TSH; Future  -     T3, free; Future  -     T4, free; Future  -     Thyrogloblin by LCMS; Future    3. Left-sided chest pain  -     Ambulatory Referral to Physical Therapy for Evaluation & Treatment    4. Gastroesophageal reflux disease, unspecified whether esophagitis present    5. Lymphadenopathy    6. Abnormal uterine bleeding    7. Trochanteric bursitis of left hip  -     Ambulatory Referral to Sports Medicine    8. Hyperlipidemia, unspecified hyperlipidemia type  -     Vascular Screening (Bundle) CAR; Future    Other orders  -     levothyroxine (Synthroid) 125 MCG tablet; Take 3 days a week, alt with 112 mcg daily , first week take 1 tab daily only  Dispense: 43 tablet; Refill: 2               Preventative counseling- continue to eat healthy and exercise   1. Hx of thyroid ca/hypothyroidism- will alt 112(4 days a week) and 125 (3 days a week), recheck in 6 weeks.   2. Left sided chest pain/gerd- has been taking OTC natural supplement but has been missing it. Journal symptoms. Will also order PT in case of musculoskeletal strain.   3. Lymphadenopathy- had seen ENT and had scope, stable, if they change ENT would like her to return.   4.  Abnormal uterine bleeding- notify GYN  5. Left hip bursitis- will place sports med referral   6. HPL- CT calcium score 2023 0, no med needed, will continue to monitor     Discussed shingles vaccines      GYN- UTD per patient

## 2024-11-05 NOTE — PATIENT INSTRUCTIONS
Preventative counseling- continue to eat healthy and exercise   1. Hx of thyroid ca/hypothyroidism- will alt 112(4 days a week) and 125 (3 days a week), recheck in 6 weeks.   2. Left sided chest pain/gerd- has been taking OTC natural supplement but has been missing it. Journal symptoms. Will also order PT in case of musculoskeletal strain.   3. Lymphadenopathy- had seen ENT and had scope, stable, if they change ENT would like her to return.   4. Abnormal uterine bleeding- notify GYN  5. Left hip bursitis- will place sports med referral   6. HPL- CT calcium score 2023 0, no med needed, will continue to monitor     Discussed shingles vaccines

## 2024-12-27 DIAGNOSIS — Z85.850 HISTORY OF MEDULLARY CARCINOMA OF THYROID: Primary | ICD-10-CM

## 2024-12-27 RX ORDER — LEVOTHYROXINE SODIUM 125 UG/1
125 TABLET ORAL DAILY
Qty: 90 TABLET | Refills: 0 | Status: SHIPPED | OUTPATIENT
Start: 2024-12-27

## 2025-01-14 ENCOUNTER — HOSPITAL ENCOUNTER (OUTPATIENT)
Dept: CARDIOLOGY | Facility: HOSPITAL | Age: 55
Discharge: HOME | End: 2025-01-14
Admitting: NURSE PRACTITIONER

## 2025-01-14 DIAGNOSIS — E78.5 HYPERLIPIDEMIA, UNSPECIFIED HYPERLIPIDEMIA TYPE: ICD-10-CM

## 2025-01-14 PROCEDURE — 93799 UNLISTED CV SVC/PROCEDURE: CPT

## 2025-01-16 LAB
BH CV VAS SCREENING CAROTID CCA LEFT: 80.1 CM/SEC
BH CV VAS SCREENING CAROTID CCA RIGHT: 82.6 CM/SEC
BH CV VAS SCREENING CAROTID ICA LEFT: 81.4 CM/SEC
BH CV VAS SCREENING CAROTID ICA RIGHT: 114 CM/SEC
BH CV XLRA MEAS - MID AO DIAM: 1.72 CM
BH CV XLRA MEAS - PAD LEFT ABI PT: 1.25
BH CV XLRA MEAS - PAD LEFT ARM: 135 MMHG
BH CV XLRA MEAS - PAD LEFT LEG PT: 180 MMHG
BH CV XLRA MEAS - PAD RIGHT ABI PT: 1.15
BH CV XLRA MEAS - PAD RIGHT ARM: 144 MMHG
BH CV XLRA MEAS - PAD RIGHT LEG PT: 166 MMHG
BH CV XLRA MEAS LEFT DIST CCA EDV: -28 CM/SEC
BH CV XLRA MEAS LEFT DIST CCA PSV: -80.1 CM/SEC
BH CV XLRA MEAS LEFT ICA/CCA RATIO: 1
BH CV XLRA MEAS LEFT PROX ICA EDV: -38.5 CM/SEC
BH CV XLRA MEAS LEFT PROX ICA PSV: -81.4 CM/SEC
BH CV XLRA MEAS RIGHT DIST CCA EDV: -31.1 CM/SEC
BH CV XLRA MEAS RIGHT DIST CCA PSV: -82.6 CM/SEC
BH CV XLRA MEAS RIGHT ICA/CCA RATIO: 1.4
BH CV XLRA MEAS RIGHT PROX ICA EDV: -42.2 CM/SEC
BH CV XLRA MEAS RIGHT PROX ICA PSV: -114 CM/SEC

## 2025-02-26 DIAGNOSIS — E78.5 HYPERLIPIDEMIA, UNSPECIFIED HYPERLIPIDEMIA TYPE: ICD-10-CM

## 2025-02-26 DIAGNOSIS — E03.9 HYPOTHYROIDISM, UNSPECIFIED TYPE: ICD-10-CM

## 2025-02-26 DIAGNOSIS — E55.9 VITAMIN D DEFICIENCY: Primary | ICD-10-CM

## 2025-04-07 RX ORDER — LEVOTHYROXINE SODIUM 125 UG/1
125 TABLET ORAL DAILY
Qty: 90 TABLET | Refills: 0 | Status: SHIPPED | OUTPATIENT
Start: 2025-04-07

## 2025-05-06 ENCOUNTER — OFFICE VISIT (OUTPATIENT)
Dept: INTERNAL MEDICINE | Facility: CLINIC | Age: 55
End: 2025-05-06
Payer: COMMERCIAL

## 2025-05-06 VITALS
HEART RATE: 103 BPM | DIASTOLIC BLOOD PRESSURE: 82 MMHG | OXYGEN SATURATION: 98 % | BODY MASS INDEX: 26.98 KG/M2 | WEIGHT: 158 LBS | HEIGHT: 64 IN | SYSTOLIC BLOOD PRESSURE: 118 MMHG

## 2025-05-06 DIAGNOSIS — K91.1 DUMPING SYNDROME: ICD-10-CM

## 2025-05-06 DIAGNOSIS — R74.8 ELEVATED LIVER ENZYMES: ICD-10-CM

## 2025-05-06 DIAGNOSIS — Z85.850 HISTORY OF MEDULLARY CARCINOMA OF THYROID: ICD-10-CM

## 2025-05-06 DIAGNOSIS — R63.5 WEIGHT GAIN: ICD-10-CM

## 2025-05-06 DIAGNOSIS — E03.9 HYPOTHYROIDISM, UNSPECIFIED TYPE: Primary | ICD-10-CM

## 2025-05-06 DIAGNOSIS — R07.9 CHEST PAIN, UNSPECIFIED TYPE: ICD-10-CM

## 2025-05-06 DIAGNOSIS — E78.5 HYPERLIPIDEMIA, UNSPECIFIED HYPERLIPIDEMIA TYPE: ICD-10-CM

## 2025-05-06 PROCEDURE — 99214 OFFICE O/P EST MOD 30 MIN: CPT | Performed by: NURSE PRACTITIONER

## 2025-05-06 NOTE — PROGRESS NOTES
Subjective   Juliana Rodriguez is a 54 y.o. adult.      History of Present Illness   The patient is here today to F/U on lab work. She is worried about wt gain.   Hypothyroidism/hx of thyroid ca -Pt is doing well with current medication regimen, denies adverse reactions, compliant with medication schedule.     Periods are irregular, last 2/3rd- concerned about wt gain, going to a functional med doctor soon regarding this soon.     Dumping syndrome-March- April feels she had some kind of bug, had rectal urgency with night time awakenings up to 7 times a day. It has completely resolved. She did start a probiotic. Lab work was done right after all of this.   Used to take cholestyramine, worked well      Dtr Alli, Son Raymundo our patients.   The following portions of the patient's history were reviewed and updated as appropriate: allergies, current medications, past family history, past medical history, past social history, past surgical history and problem list.    Review of Systems   Constitutional:  Negative for fever and unexpected weight loss.   Respiratory: Negative.     Cardiovascular:  Positive for chest pain (with uphill, climbing intense work out, recent neg stress test, Ambar always had back problems.).   Gastrointestinal:  Positive for abdominal pain, diarrhea and nausea. Negative for constipation and vomiting.   Genitourinary:  Negative for dysuria, frequency and hematuria.   Musculoskeletal:  Negative for arthralgias and myalgias.       Objective   Physical Exam  Constitutional:       Appearance: Normal appearance. She is well-developed.   Neck:      Thyroid: No thyromegaly.   Cardiovascular:      Rate and Rhythm: Normal rate and regular rhythm.      Heart sounds: Normal heart sounds.   Pulmonary:      Effort: Pulmonary effort is normal.      Breath sounds: Normal breath sounds.   Musculoskeletal:      Cervical back: Normal range of motion and neck supple.   Lymphadenopathy:      Cervical: No cervical  adenopathy.   Skin:     General: Skin is warm and dry.   Neurological:      Mental Status: She is alert.   Psychiatric:         Behavior: Behavior normal.         Thought Content: Thought content normal.         Judgment: Judgment normal.         Vitals:    05/06/25 1304   BP: 118/82   Pulse: 103   SpO2: 98%     Body mass index is 27.11 kg/m².    Current Outpatient Medications:     Acetylcysteine (NAC) 500 MG capsule, , Disp: , Rfl:     Cholecalciferol (VITAMIN D3) 1000 UNITS capsule, Take 1 capsule by mouth Daily., Disp: , Rfl:     levothyroxine (SYNTHROID, LEVOTHROID) 125 MCG tablet, TAKE 1 TABLET BY MOUTH EVERY DAY, Disp: 90 tablet, Rfl: 0    Magnesium Glycinate 100 MG capsule, , Disp: , Rfl:     multivitamin with minerals tablet tablet, Take 1 tablet by mouth Daily., Disp: , Rfl:      Assessment & Plan   Diagnoses and all orders for this visit:    1. Hypothyroidism, unspecified type (Primary)  -     Comprehensive Metabolic Panel; Future  -     Lipid Panel With LDL / HDL Ratio; Future  -     TSH Rfx On Abnormal To Free T4; Future  -     CBC & Differential; Future    2. Chest pain, unspecified type    3. Elevated liver enzymes  -     Comprehensive Metabolic Panel; Future  -     Lipid Panel With LDL / HDL Ratio; Future  -     TSH Rfx On Abnormal To Free T4; Future  -     CBC & Differential; Future    4. Weight gain    5. Dumping syndrome  -     Comprehensive Metabolic Panel; Future  -     Lipid Panel With LDL / HDL Ratio; Future  -     TSH Rfx On Abnormal To Free T4; Future  -     CBC & Differential; Future    6. Hyperlipidemia, unspecified hyperlipidemia type  -     Comprehensive Metabolic Panel; Future  -     Lipid Panel With LDL / HDL Ratio; Future  -     TSH Rfx On Abnormal To Free T4; Future  -     CBC & Differential; Future    7. History of medullary carcinoma of thyroid  -     Comprehensive Metabolic Panel; Future  -     Lipid Panel With LDL / HDL Ratio; Future  -     TSH Rfx On Abnormal To Free T4;  Future  -     CBC & Differential; Future        Results Encounter on 04/09/2025   Component Date Value Ref Range Status    WBC 04/30/2025 8.37  3.40 - 10.80 10*3/mm3 Final    RBC 04/30/2025 5.07  3.77 - 5.28 10*6/mm3 Final    Hemoglobin 04/30/2025 14.4  12.0 - 15.9 g/dL Final    Hematocrit 04/30/2025 43.5  34.0 - 46.6 % Final    MCV 04/30/2025 85.8  79.0 - 97.0 fL Final    MCH 04/30/2025 28.4  26.6 - 33.0 pg Final    MCHC 04/30/2025 33.1  31.5 - 35.7 g/dL Final    RDW 04/30/2025 14.1  12.3 - 15.4 % Final    Platelets 04/30/2025 247  140 - 450 10*3/mm3 Final    Neutrophil Rel % 04/30/2025 49.2  42.7 - 76.0 % Final    Lymphocyte Rel % 04/30/2025 37.2  19.6 - 45.3 % Final    Monocyte Rel % 04/30/2025 5.7  5.0 - 12.0 % Final    Eosinophil Rel % 04/30/2025 6.8 (H)  0.3 - 6.2 % Final    Basophil Rel % 04/30/2025 0.7  0.0 - 1.5 % Final    Neutrophils Absolute 04/30/2025 4.12  1.70 - 7.00 10*3/mm3 Final    Lymphocytes Absolute 04/30/2025 3.11 (H)  0.70 - 3.10 10*3/mm3 Final    Monocytes Absolute 04/30/2025 0.48  0.10 - 0.90 10*3/mm3 Final    Eosinophils Absolute 04/30/2025 0.57 (H)  0.00 - 0.40 10*3/mm3 Final    Basophils Absolute 04/30/2025 0.06  0.00 - 0.20 10*3/mm3 Final    Immature Granulocyte Rel % 04/30/2025 0.4  0.0 - 0.5 % Final    Immature Grans Absolute 04/30/2025 0.03  0.00 - 0.05 10*3/mm3 Final    nRBC 04/30/2025 0.0  0.0 - 0.2 /100 WBC Final    Glucose 04/30/2025 93  65 - 99 mg/dL Final    BUN 04/30/2025 9  6 - 20 mg/dL Final    Creatinine 04/30/2025 0.87  0.57 - 1.00 mg/dL Final    EGFR Result 04/30/2025 78.3  >60.0 mL/min/1.73 Final    Comment: GFR Categories in Chronic Kidney Disease (CKD)  GFR Category          GFR (mL/min/1.73)    Interpretation  G1                    90 or greater        Normal or high  (1)  G2                    60-89                Mild decrease  (1)  G3a                   45-59                Mild to moderate  decrease  G3b                   30-44                Moderate to  severe  decrease  G4                    15-29                Severe decrease  G5                    14 or less           Kidney failure  (1)In the absence of evidence of kidney disease, neither  GFR category G1 or G2 fulfill the criteria for CKD.  eGFR calculation 2021 CKD-EPI creatinine equation, which  does not include race as a factor      BUN/Creatinine Ratio 04/30/2025 10.3  7.0 - 25.0 Final    Sodium 04/30/2025 144  136 - 145 mmol/L Final    Potassium 04/30/2025 4.5  3.5 - 5.2 mmol/L Final    Chloride 04/30/2025 107  98 - 107 mmol/L Final    Total CO2 04/30/2025 23.0  22.0 - 29.0 mmol/L Final    Calcium 04/30/2025 9.2  8.6 - 10.5 mg/dL Final    Total Protein 04/30/2025 6.9  6.0 - 8.5 g/dL Final    Albumin 04/30/2025 4.5  3.5 - 5.2 g/dL Final    Globulin 04/30/2025 2.4  gm/dL Final    A/G Ratio 04/30/2025 1.9  g/dL Final    Total Bilirubin 04/30/2025 0.4  0.0 - 1.2 mg/dL Final    Alkaline Phosphatase 04/30/2025 65  39 - 117 U/L Final    AST (SGOT) 04/30/2025 31  1 - 32 U/L Final    ALT (SGPT) 04/30/2025 48 (H)  1 - 33 U/L Final    TSH 04/30/2025 0.283  0.270 - 4.200 uIU/mL Final    25 Hydroxy, Vitamin D 04/30/2025 77.6  30.0 - 100.0 ng/ml Final    Comment: Reference Range for Total Vitamin D 25(OH)  Deficiency <20.0 ng/mL  Insufficiency 21-29 ng/mL  Sufficiency  ng/mL  Toxicity >100 ng/ml      Free T4 04/30/2025 1.46  0.93 - 1.70 ng/dL Final    Results may be falsely increased if patient taking Biotin.           1. Hx of thyroid ca- goal TSH <1, continue 112(4 days a week) and 125 (3 days a week)   2. Chest pain- suggest PT eval, CT calcium score of 0 of 2023 and neg stress test 8/2024  3. Elevated liver enzyme- likely due to recent GI illness, she will have this rechecked in July with functional medicine  4. Wt gain- discuss hormone with spec   5. Dumping syndrome- ok to return to cholestyramine, she will send spec .

## 2025-05-07 RX ORDER — CHOLESTYRAMINE 4 G/9G
1 POWDER, FOR SUSPENSION ORAL 2 TIMES DAILY WITH MEALS
Qty: 180 PACKET | Refills: 2 | Status: SHIPPED | OUTPATIENT
Start: 2025-05-07

## 2025-07-01 RX ORDER — LEVOTHYROXINE SODIUM 125 UG/1
125 TABLET ORAL DAILY
Qty: 90 TABLET | Refills: 0 | Status: SHIPPED | OUTPATIENT
Start: 2025-07-01

## 2025-07-28 ENCOUNTER — TELEPHONE (OUTPATIENT)
Dept: INTERNAL MEDICINE | Facility: CLINIC | Age: 55
End: 2025-07-28

## 2025-07-31 RX ORDER — COLESEVELAM 180 1/1
1875 TABLET ORAL 2 TIMES DAILY
Qty: 180 TABLET | Refills: 0 | Status: SHIPPED | OUTPATIENT
Start: 2025-07-31

## 2025-08-26 RX ORDER — COLESEVELAM 180 1/1
1875 TABLET ORAL 2 TIMES DAILY
Qty: 540 TABLET | Refills: 0 | Status: SHIPPED | OUTPATIENT
Start: 2025-08-26

## 2025-08-26 RX ORDER — COLESEVELAM 180 1/1
1875 TABLET ORAL 2 TIMES DAILY
Qty: 180 TABLET | Refills: 0 | Status: SHIPPED | OUTPATIENT
Start: 2025-08-26 | End: 2025-08-26 | Stop reason: SDUPTHER